# Patient Record
Sex: MALE | Race: WHITE | Employment: FULL TIME | URBAN - METROPOLITAN AREA
[De-identification: names, ages, dates, MRNs, and addresses within clinical notes are randomized per-mention and may not be internally consistent; named-entity substitution may affect disease eponyms.]

---

## 2017-03-27 RX ORDER — LOSARTAN POTASSIUM 100 MG/1
TABLET ORAL
Qty: 90 TABLET | Refills: 1 | Status: SHIPPED | OUTPATIENT
Start: 2017-03-27 | End: 2017-09-23 | Stop reason: SDUPTHER

## 2017-05-26 ENCOUNTER — TELEPHONE (OUTPATIENT)
Dept: FAMILY MEDICINE CLINIC | Age: 47
End: 2017-05-26

## 2017-05-26 DIAGNOSIS — R74.8 ABNORMAL TRANSAMINASES: ICD-10-CM

## 2017-05-26 DIAGNOSIS — Z11.4 SCREENING FOR HIV (HUMAN IMMUNODEFICIENCY VIRUS): ICD-10-CM

## 2017-05-26 DIAGNOSIS — Z00.00 ROUTINE GENERAL MEDICAL EXAMINATION AT A HEALTH CARE FACILITY: Primary | ICD-10-CM

## 2017-05-26 DIAGNOSIS — Z13.220 LIPID SCREENING: ICD-10-CM

## 2017-05-26 DIAGNOSIS — R73.01 IMPAIRED FASTING GLUCOSE: ICD-10-CM

## 2017-09-25 RX ORDER — LOSARTAN POTASSIUM 100 MG/1
TABLET ORAL
Qty: 90 TABLET | Refills: 0 | Status: SHIPPED | OUTPATIENT
Start: 2017-09-25 | End: 2017-10-24 | Stop reason: SDUPTHER

## 2017-10-07 ENCOUNTER — HOSPITAL ENCOUNTER (OUTPATIENT)
Age: 47
Discharge: HOME | End: 2017-10-07
Payer: COMMERCIAL

## 2017-10-07 DIAGNOSIS — R73.01 IMPAIRED FASTING GLUCOSE: ICD-10-CM

## 2017-10-07 DIAGNOSIS — Z13.220 LIPID SCREENING: ICD-10-CM

## 2017-10-07 DIAGNOSIS — Z11.4 SCREENING FOR HIV (HUMAN IMMUNODEFICIENCY VIRUS): ICD-10-CM

## 2017-10-07 DIAGNOSIS — Z00.00 ROUTINE GENERAL MEDICAL EXAMINATION AT A HEALTH CARE FACILITY: ICD-10-CM

## 2017-10-07 DIAGNOSIS — R74.8 ABNORMAL TRANSAMINASES: ICD-10-CM

## 2017-10-07 LAB
ALBUMIN SERPL-MCNC: 4.4 G/DL (ref 3.5–5.2)
ALBUMIN/GLOBULIN RATIO: 1.5 (ref 1–2.5)
ALP BLD-CCNC: 52 U/L (ref 40–129)
ALT SERPL-CCNC: 33 U/L (ref 5–41)
ANION GAP SERPL CALCULATED.3IONS-SCNC: 14 MMOL/L (ref 9–17)
AST SERPL-CCNC: 23 U/L
BILIRUB SERPL-MCNC: 0.58 MG/DL (ref 0.3–1.2)
BUN BLDV-MCNC: 17 MG/DL (ref 6–20)
BUN/CREAT BLD: NORMAL (ref 9–20)
CALCIUM SERPL-MCNC: 9.3 MG/DL (ref 8.6–10.4)
CHLORIDE BLD-SCNC: 100 MMOL/L (ref 98–107)
CHOLESTEROL/HDL RATIO: 3.7
CHOLESTEROL: 208 MG/DL
CO2: 25 MMOL/L (ref 20–31)
CREAT SERPL-MCNC: 0.97 MG/DL (ref 0.7–1.2)
GFR AFRICAN AMERICAN: >60 ML/MIN
GFR NON-AFRICAN AMERICAN: >60 ML/MIN
GFR SERPL CREATININE-BSD FRML MDRD: NORMAL ML/MIN/{1.73_M2}
GFR SERPL CREATININE-BSD FRML MDRD: NORMAL ML/MIN/{1.73_M2}
GLUCOSE BLD-MCNC: 93 MG/DL (ref 70–99)
HDLC SERPL-MCNC: 56 MG/DL
HIV AG/AB: NONREACTIVE
LDL CHOLESTEROL: 126 MG/DL (ref 0–130)
POTASSIUM SERPL-SCNC: 4.9 MMOL/L (ref 3.7–5.3)
SODIUM BLD-SCNC: 139 MMOL/L (ref 135–144)
TOTAL PROTEIN: 7.4 G/DL (ref 6.4–8.3)
TRIGL SERPL-MCNC: 129 MG/DL
VLDLC SERPL CALC-MCNC: ABNORMAL MG/DL (ref 1–30)

## 2017-10-07 PROCEDURE — 87389 HIV-1 AG W/HIV-1&-2 AB AG IA: CPT

## 2017-10-07 PROCEDURE — 80061 LIPID PANEL: CPT

## 2017-10-07 PROCEDURE — 36415 COLL VENOUS BLD VENIPUNCTURE: CPT

## 2017-10-07 PROCEDURE — 80053 COMPREHEN METABOLIC PANEL: CPT

## 2017-10-09 PROBLEM — E78.00 HIGH CHOLESTEROL: Status: ACTIVE | Noted: 2017-10-09

## 2017-10-13 ENCOUNTER — OFFICE VISIT (OUTPATIENT)
Dept: FAMILY MEDICINE CLINIC | Age: 47
End: 2017-10-13
Payer: COMMERCIAL

## 2017-10-13 VITALS
DIASTOLIC BLOOD PRESSURE: 88 MMHG | SYSTOLIC BLOOD PRESSURE: 137 MMHG | BODY MASS INDEX: 30.21 KG/M2 | HEART RATE: 66 BPM | WEIGHT: 211 LBS | HEIGHT: 70 IN

## 2017-10-13 DIAGNOSIS — E78.00 HIGH CHOLESTEROL: ICD-10-CM

## 2017-10-13 DIAGNOSIS — I10 ESSENTIAL HYPERTENSION: ICD-10-CM

## 2017-10-13 DIAGNOSIS — Z00.00 ROUTINE GENERAL MEDICAL EXAMINATION AT A HEALTH CARE FACILITY: Primary | ICD-10-CM

## 2017-10-13 PROCEDURE — 99396 PREV VISIT EST AGE 40-64: CPT | Performed by: FAMILY MEDICINE

## 2017-10-13 ASSESSMENT — PATIENT HEALTH QUESTIONNAIRE - PHQ9
1. LITTLE INTEREST OR PLEASURE IN DOING THINGS: 0
SUM OF ALL RESPONSES TO PHQ9 QUESTIONS 1 & 2: 0
SUM OF ALL RESPONSES TO PHQ QUESTIONS 1-9: 0
2. FEELING DOWN, DEPRESSED OR HOPELESS: 0

## 2017-10-13 NOTE — PATIENT INSTRUCTIONS
A Healthy Lifestyle: Care Instructions  Your Care Instructions  A healthy lifestyle can help you feel good, stay at a healthy weight, and have plenty of energy for both work and play. A healthy lifestyle is something you can share with your whole family. A healthy lifestyle also can lower your risk for serious health problems, such as high blood pressure, heart disease, and diabetes. You can follow a few steps listed below to improve your health and the health of your family. Follow-up care is a key part of your treatment and safety. Be sure to make and go to all appointments, and call your doctor if you are having problems. Its also a good idea to know your test results and keep a list of the medicines you take. How can you care for yourself at home? · Do not eat too much sugar, fat, or fast foods. You can still have dessert and treats now and then. The goal is moderation. · Start small to improve your eating habits. Pay attention to portion sizes, drink less juice and soda pop, and eat more fruits and vegetables. ¨ Eat a healthy amount of food. A 3-ounce serving of meat, for example, is about the size of a deck of cards. Fill the rest of your plate with vegetables and whole grains. ¨ Limit the amount of soda and sports drinks you have every day. Drink more water when you are thirsty. ¨ Eat at least 5 servings of fruits and vegetables every day. It may seem like a lot, but it is not hard to reach this goal. A serving or helping is 1 piece of fruit, 1 cup of vegetables, or 2 cups of leafy, raw vegetables. Have an apple or some carrot sticks as an afternoon snack instead of a candy bar. Try to have fruits and/or vegetables at every meal.  · Make exercise part of your daily routine. You may want to start with simple activities, such as walking, bicycling, or slow swimming. Try to be active 30 to 60 minutes every day. You do not need to do all 30 to 60 minutes all at once.  For example, you can exercise 3

## 2017-10-13 NOTE — PROGRESS NOTES
5' 10.08\" (1.78 m)   Wt 211 lb (95.7 kg)   BMI 30.21 kg/m²   Constitutional:  He is well-groomed, well-dressed, and in no acute distress. HENT:  Normocephalic, Atraumatic, Bilateral external ears normal, Oropharynx moist, No oral exudates, Nose normal. Neck- Normal range of motion, No tenderness, Supple, No stridor. Eyes:  PERRL, EOMI, Conjunctiva normal, No discharge. Respiratory:  Normal breath sounds, No respiratory distress, No wheezing, No chest tenderness. Cardiovascular:  Normal heart rate, Normal rhythm, No murmurs, No rubs, No gallops. GI:  Soft, No tenderness  Musculoskeletal:  Intact distal pulses, No edema, No tenderness, No cyanosis, No clubbing. Good range of motion in all major joints. No tenderness to palpation or major deformities noted. Back- No tenderness. Integument:  No obvious lesions on exposed skin. Lymphatic:  No lymphadenopathy noted. Neurologic:  Alert & oriented x 3, Normal motor function, Normal sensory function, No focal deficits noted. Psychiatric:  Affect normal, Judgment normal, Mood normal.     RESULTS  The 10-year ASCVD risk score (Viry Vigil., et al., 2013) is: 7.7%    Values used to calculate the score:      Age: 51 years      Sex: Male      Is Non- : No      Diabetic: No      Tobacco smoker: Yes      Systolic Blood Pressure: 340 mmHg      Is BP treated: Yes      HDL Cholesterol: 56 mg/dL      Total Cholesterol: 208 mg/dL       Chemistry        Component Value Date/Time     10/07/2017 0750    K 4.9 10/07/2017 0750     10/07/2017 0750    CO2 25 10/07/2017 0750    BUN 17 10/07/2017 0750    CREATININE 0.97 10/07/2017 0750        Component Value Date/Time    CALCIUM 9.3 10/07/2017 0750    ALKPHOS 52 10/07/2017 0750    AST 23 10/07/2017 0750    ALT 33 10/07/2017 0750    BILITOT 0.58 10/07/2017 0750        Glucose   Date Value Ref Range Status   10/07/2017 93 70 - 99 mg/dL Final         FINAL DIAGNOSIS AND ORDERS  1.  Routine general medical examination at a health care facility     2. Essential hypertension     3. High cholesterol         ASSESSMENT & PLAN  3  56-year-old man, with the above noted issues, who appears generally healthy. 2.  Continue current treatment for high blood pressure. 3.  I have recommended a 81 mg aspirin tablet daily. 4.  Follow up in our office in approximately 1 year or as needed. DISCHARGE MEDS  Outpatient Encounter Prescriptions as of 10/13/2017   Medication Sig Dispense Refill    losartan (COZAAR) 100 MG tablet TAKE 1 TABLET DAILY 90 tablet 0    hydrocortisone (PROCTOSOL HC) 2.5 % rectal cream Place rectally 2 times daily. 28.35 g 2    triamcinolone (KENALOG) 0.1 % cream       clobetasol (TEMOVATE) 0.05 % external solution        No facility-administered encounter medications on file as of 10/13/2017.

## 2017-10-24 RX ORDER — CLOBETASOL PROPIONATE 0.46 MG/ML
SOLUTION TOPICAL
Qty: 1 BOTTLE | Refills: 2 | Status: SHIPPED | OUTPATIENT
Start: 2017-10-24 | End: 2018-10-26 | Stop reason: SDUPTHER

## 2017-10-24 RX ORDER — LOSARTAN POTASSIUM 100 MG/1
TABLET ORAL
Qty: 90 TABLET | Refills: 1 | Status: SHIPPED | OUTPATIENT
Start: 2017-10-24 | End: 2018-04-23 | Stop reason: SDUPTHER

## 2018-04-23 DIAGNOSIS — K62.89 PROCTITIS: ICD-10-CM

## 2018-04-23 RX ORDER — LOSARTAN POTASSIUM 100 MG/1
TABLET ORAL
Qty: 90 TABLET | Refills: 1 | Status: SHIPPED | OUTPATIENT
Start: 2018-04-23 | End: 2018-10-26 | Stop reason: SDUPTHER

## 2018-04-23 RX ORDER — TRIAMCINOLONE ACETONIDE 1 MG/G
CREAM TOPICAL
Qty: 15 G | Refills: 1 | Status: SHIPPED | OUTPATIENT
Start: 2018-04-23

## 2018-05-09 ENCOUNTER — OFFICE VISIT (OUTPATIENT)
Dept: PRIMARY CARE CLINIC | Age: 48
End: 2018-05-09
Payer: COMMERCIAL

## 2018-05-09 ENCOUNTER — HOSPITAL ENCOUNTER (OUTPATIENT)
Age: 48
Setting detail: SPECIMEN
Discharge: HOME | End: 2018-05-09
Payer: COMMERCIAL

## 2018-05-09 VITALS
RESPIRATION RATE: 16 BRPM | SYSTOLIC BLOOD PRESSURE: 130 MMHG | TEMPERATURE: 98.4 F | WEIGHT: 200 LBS | DIASTOLIC BLOOD PRESSURE: 78 MMHG | BODY MASS INDEX: 28.63 KG/M2 | HEART RATE: 84 BPM

## 2018-05-09 DIAGNOSIS — R74.8 ABNORMAL TRANSAMINASES: ICD-10-CM

## 2018-05-09 DIAGNOSIS — R10.9 ABDOMINAL CRAMPING: ICD-10-CM

## 2018-05-09 DIAGNOSIS — R73.01 IMPAIRED FASTING GLUCOSE: ICD-10-CM

## 2018-05-09 DIAGNOSIS — R19.4 BOWEL HABIT CHANGES: ICD-10-CM

## 2018-05-09 DIAGNOSIS — R19.7 DIARRHEA, UNSPECIFIED TYPE: Primary | ICD-10-CM

## 2018-05-09 LAB
BILIRUBIN URINE: NEGATIVE
BILIRUBIN, POC: NORMAL
BLOOD URINE, POC: NORMAL
CLARITY, POC: CLEAR
COLOR, POC: YELLOW
COLOR: YELLOW
COMMENT UA: NORMAL
GLUCOSE URINE, POC: NORMAL
GLUCOSE URINE: NEGATIVE
HBA1C MFR BLD: 5.2 %
KETONES, POC: NORMAL
KETONES, URINE: NEGATIVE
LEUKOCYTE EST, POC: NORMAL
LEUKOCYTE ESTERASE, URINE: NEGATIVE
NITRITE, POC: NORMAL
NITRITE, URINE: NEGATIVE
PH UA: 5 (ref 5–8)
PH, POC: 5.5
PROTEIN UA: NEGATIVE
PROTEIN, POC: NORMAL
SPECIFIC GRAVITY UA: 1.03 (ref 1–1.03)
SPECIFIC GRAVITY, POC: 1.02
TURBIDITY: CLEAR
URINE HGB: NEGATIVE
UROBILINOGEN, POC: 1
UROBILINOGEN, URINE: NORMAL

## 2018-05-09 PROCEDURE — 81002 URINALYSIS NONAUTO W/O SCOPE: CPT | Performed by: NURSE PRACTITIONER

## 2018-05-09 PROCEDURE — 83036 HEMOGLOBIN GLYCOSYLATED A1C: CPT | Performed by: NURSE PRACTITIONER

## 2018-05-09 PROCEDURE — 99214 OFFICE O/P EST MOD 30 MIN: CPT | Performed by: NURSE PRACTITIONER

## 2018-05-09 ASSESSMENT — ENCOUNTER SYMPTOMS
VOMITING: 0
DIARRHEA: 1
ABDOMINAL DISTENTION: 0
BLOATING: 0
FLATUS: 1
SHORTNESS OF BREATH: 0
ABDOMINAL PAIN: 0
CONSTIPATION: 0
RECTAL PAIN: 0
ANAL BLEEDING: 0
RHINORRHEA: 0
BLOOD IN STOOL: 0
COUGH: 0
NAUSEA: 1

## 2018-05-12 ENCOUNTER — HOSPITAL ENCOUNTER (OUTPATIENT)
Age: 48
Discharge: HOME | End: 2018-05-12
Payer: COMMERCIAL

## 2018-05-12 DIAGNOSIS — R74.8 ABNORMAL TRANSAMINASES: ICD-10-CM

## 2018-05-12 DIAGNOSIS — R10.9 ABDOMINAL CRAMPING: ICD-10-CM

## 2018-05-12 DIAGNOSIS — R19.4 BOWEL HABIT CHANGES: ICD-10-CM

## 2018-05-12 DIAGNOSIS — R19.7 DIARRHEA, UNSPECIFIED TYPE: ICD-10-CM

## 2018-05-12 LAB
ALBUMIN SERPL-MCNC: 4.2 G/DL (ref 3.5–5.2)
ALBUMIN/GLOBULIN RATIO: 1.5 (ref 1–2.5)
ALP BLD-CCNC: 67 U/L (ref 40–129)
ALT SERPL-CCNC: 32 U/L (ref 5–41)
ANION GAP SERPL CALCULATED.3IONS-SCNC: 10 MMOL/L (ref 9–17)
AST SERPL-CCNC: 19 U/L
BILIRUB SERPL-MCNC: 0.35 MG/DL (ref 0.3–1.2)
BUN BLDV-MCNC: 15 MG/DL (ref 6–20)
BUN/CREAT BLD: NORMAL (ref 9–20)
CALCIUM SERPL-MCNC: 9 MG/DL (ref 8.6–10.4)
CHLORIDE BLD-SCNC: 104 MMOL/L (ref 98–107)
CO2: 27 MMOL/L (ref 20–31)
CREAT SERPL-MCNC: 0.98 MG/DL (ref 0.7–1.2)
GFR AFRICAN AMERICAN: >60 ML/MIN
GFR NON-AFRICAN AMERICAN: >60 ML/MIN
GFR SERPL CREATININE-BSD FRML MDRD: NORMAL ML/MIN/{1.73_M2}
GFR SERPL CREATININE-BSD FRML MDRD: NORMAL ML/MIN/{1.73_M2}
GLUCOSE BLD-MCNC: 90 MG/DL (ref 70–99)
HCT VFR BLD CALC: 44.8 % (ref 40.7–50.3)
HEMOGLOBIN: 14.9 G/DL (ref 13–17)
MCH RBC QN AUTO: 29.5 PG (ref 25.2–33.5)
MCHC RBC AUTO-ENTMCNC: 33.3 G/DL (ref 28.4–34.8)
MCV RBC AUTO: 88.7 FL (ref 82.6–102.9)
NRBC AUTOMATED: 0 PER 100 WBC
PDW BLD-RTO: 12.1 % (ref 11.8–14.4)
PLATELET # BLD: 296 K/UL (ref 138–453)
PMV BLD AUTO: 10.1 FL (ref 8.1–13.5)
POTASSIUM SERPL-SCNC: 4.4 MMOL/L (ref 3.7–5.3)
RBC # BLD: 5.05 M/UL (ref 4.21–5.77)
SODIUM BLD-SCNC: 141 MMOL/L (ref 135–144)
TOTAL PROTEIN: 7 G/DL (ref 6.4–8.3)
TSH SERPL DL<=0.05 MIU/L-ACNC: 4.1 MIU/L (ref 0.3–5)
WBC # BLD: 6.3 K/UL (ref 3.5–11.3)

## 2018-05-12 PROCEDURE — 84443 ASSAY THYROID STIM HORMONE: CPT

## 2018-05-12 PROCEDURE — 85027 COMPLETE CBC AUTOMATED: CPT

## 2018-05-12 PROCEDURE — 36415 COLL VENOUS BLD VENIPUNCTURE: CPT

## 2018-05-12 PROCEDURE — 80053 COMPREHEN METABOLIC PANEL: CPT

## 2018-05-16 ENCOUNTER — OFFICE VISIT (OUTPATIENT)
Dept: PRIMARY CARE CLINIC | Age: 48
End: 2018-05-16
Payer: COMMERCIAL

## 2018-05-16 VITALS
WEIGHT: 200 LBS | RESPIRATION RATE: 16 BRPM | HEART RATE: 78 BPM | DIASTOLIC BLOOD PRESSURE: 70 MMHG | SYSTOLIC BLOOD PRESSURE: 118 MMHG | BODY MASS INDEX: 28.63 KG/M2

## 2018-05-16 DIAGNOSIS — E03.8 SUBCLINICAL HYPOTHYROIDISM: Primary | ICD-10-CM

## 2018-05-16 DIAGNOSIS — R19.7 DIARRHEA, UNSPECIFIED TYPE: ICD-10-CM

## 2018-05-16 DIAGNOSIS — R74.8 ABNORMAL TRANSAMINASES: ICD-10-CM

## 2018-05-16 PROCEDURE — 99214 OFFICE O/P EST MOD 30 MIN: CPT | Performed by: NURSE PRACTITIONER

## 2018-05-16 RX ORDER — LEVOTHYROXINE SODIUM 0.05 MG/1
50 TABLET ORAL DAILY
Qty: 30 TABLET | Refills: 3 | Status: SHIPPED | OUTPATIENT
Start: 2018-05-16 | End: 2019-10-28

## 2018-06-29 ENCOUNTER — HOSPITAL ENCOUNTER (OUTPATIENT)
Age: 48
Discharge: HOME | End: 2018-06-29
Payer: COMMERCIAL

## 2018-06-29 DIAGNOSIS — E03.8 SUBCLINICAL HYPOTHYROIDISM: ICD-10-CM

## 2018-06-29 LAB — TSH SERPL DL<=0.05 MIU/L-ACNC: 4.16 MIU/L (ref 0.3–5)

## 2018-06-29 PROCEDURE — 84443 ASSAY THYROID STIM HORMONE: CPT

## 2018-06-29 PROCEDURE — 36415 COLL VENOUS BLD VENIPUNCTURE: CPT

## 2018-09-25 ENCOUNTER — TELEPHONE (OUTPATIENT)
Dept: PRIMARY CARE CLINIC | Age: 48
End: 2018-09-25

## 2018-09-25 DIAGNOSIS — R74.8 ABNORMAL TRANSAMINASES: ICD-10-CM

## 2018-09-25 DIAGNOSIS — E78.00 HIGH CHOLESTEROL: ICD-10-CM

## 2018-09-25 DIAGNOSIS — E03.8 SUBCLINICAL HYPOTHYROIDISM: ICD-10-CM

## 2018-09-25 DIAGNOSIS — R73.01 IMPAIRED FASTING GLUCOSE: Primary | ICD-10-CM

## 2018-09-25 NOTE — TELEPHONE ENCOUNTER
Patient called and requested a lab slip for his annual bloodwork , be mailed to him prior to his next appointment on 10/26/2018 .  Thank You

## 2018-10-21 ENCOUNTER — HOSPITAL ENCOUNTER (OUTPATIENT)
Age: 48
Discharge: HOME | End: 2018-10-21
Payer: COMMERCIAL

## 2018-10-21 DIAGNOSIS — E78.00 HIGH CHOLESTEROL: ICD-10-CM

## 2018-10-21 DIAGNOSIS — R73.01 IMPAIRED FASTING GLUCOSE: ICD-10-CM

## 2018-10-21 DIAGNOSIS — E03.8 SUBCLINICAL HYPOTHYROIDISM: ICD-10-CM

## 2018-10-21 DIAGNOSIS — R74.8 ABNORMAL TRANSAMINASES: ICD-10-CM

## 2018-10-21 LAB
ALBUMIN SERPL-MCNC: 4.6 G/DL (ref 3.5–5.2)
ALBUMIN/GLOBULIN RATIO: 1.6 (ref 1–2.5)
ALP BLD-CCNC: 55 U/L (ref 40–129)
ALT SERPL-CCNC: 29 U/L (ref 5–41)
ANION GAP SERPL CALCULATED.3IONS-SCNC: 18 MMOL/L (ref 9–17)
AST SERPL-CCNC: 25 U/L
BILIRUB SERPL-MCNC: 0.62 MG/DL (ref 0.3–1.2)
BUN BLDV-MCNC: 14 MG/DL (ref 6–20)
BUN/CREAT BLD: ABNORMAL (ref 9–20)
CALCIUM SERPL-MCNC: 9.5 MG/DL (ref 8.6–10.4)
CHLORIDE BLD-SCNC: 102 MMOL/L (ref 98–107)
CHOLESTEROL/HDL RATIO: 3.2
CHOLESTEROL: 204 MG/DL
CO2: 24 MMOL/L (ref 20–31)
CREAT SERPL-MCNC: 1.1 MG/DL (ref 0.7–1.2)
GFR AFRICAN AMERICAN: >60 ML/MIN
GFR NON-AFRICAN AMERICAN: >60 ML/MIN
GFR SERPL CREATININE-BSD FRML MDRD: ABNORMAL ML/MIN/{1.73_M2}
GFR SERPL CREATININE-BSD FRML MDRD: ABNORMAL ML/MIN/{1.73_M2}
GLUCOSE BLD-MCNC: 93 MG/DL (ref 70–99)
HDLC SERPL-MCNC: 64 MG/DL
LDL CHOLESTEROL: 126 MG/DL (ref 0–130)
POTASSIUM SERPL-SCNC: 5.1 MMOL/L (ref 3.7–5.3)
SODIUM BLD-SCNC: 144 MMOL/L (ref 135–144)
TOTAL PROTEIN: 7.4 G/DL (ref 6.4–8.3)
TRIGL SERPL-MCNC: 70 MG/DL
TSH SERPL DL<=0.05 MIU/L-ACNC: 3.82 MIU/L (ref 0.3–5)
VLDLC SERPL CALC-MCNC: ABNORMAL MG/DL (ref 1–30)

## 2018-10-21 PROCEDURE — 84443 ASSAY THYROID STIM HORMONE: CPT

## 2018-10-21 PROCEDURE — 80053 COMPREHEN METABOLIC PANEL: CPT

## 2018-10-21 PROCEDURE — 83036 HEMOGLOBIN GLYCOSYLATED A1C: CPT

## 2018-10-21 PROCEDURE — 36415 COLL VENOUS BLD VENIPUNCTURE: CPT

## 2018-10-21 PROCEDURE — 80061 LIPID PANEL: CPT

## 2018-10-22 LAB
ESTIMATED AVERAGE GLUCOSE: 105 MG/DL
HBA1C MFR BLD: 5.3 % (ref 4–6)

## 2018-10-26 ENCOUNTER — OFFICE VISIT (OUTPATIENT)
Dept: PRIMARY CARE CLINIC | Age: 48
End: 2018-10-26
Payer: COMMERCIAL

## 2018-10-26 VITALS
RESPIRATION RATE: 16 BRPM | HEART RATE: 90 BPM | OXYGEN SATURATION: 97 % | DIASTOLIC BLOOD PRESSURE: 80 MMHG | SYSTOLIC BLOOD PRESSURE: 122 MMHG | WEIGHT: 207.2 LBS | BODY MASS INDEX: 31.4 KG/M2 | HEIGHT: 68 IN

## 2018-10-26 DIAGNOSIS — Z23 NEED FOR INFLUENZA VACCINATION: ICD-10-CM

## 2018-10-26 DIAGNOSIS — Z00.00 ROUTINE GENERAL MEDICAL EXAMINATION AT A HEALTH CARE FACILITY: Primary | ICD-10-CM

## 2018-10-26 PROCEDURE — 90471 IMMUNIZATION ADMIN: CPT | Performed by: FAMILY MEDICINE

## 2018-10-26 PROCEDURE — 99396 PREV VISIT EST AGE 40-64: CPT | Performed by: FAMILY MEDICINE

## 2018-10-26 PROCEDURE — 90688 IIV4 VACCINE SPLT 0.5 ML IM: CPT | Performed by: FAMILY MEDICINE

## 2018-10-26 RX ORDER — CLOBETASOL PROPIONATE 0.46 MG/ML
SOLUTION TOPICAL
Qty: 1 BOTTLE | Refills: 2 | Status: SHIPPED | OUTPATIENT
Start: 2018-10-26 | End: 2018-11-01 | Stop reason: SDUPTHER

## 2018-10-26 RX ORDER — LOSARTAN POTASSIUM 100 MG/1
TABLET ORAL
Qty: 90 TABLET | Refills: 3 | Status: SHIPPED | OUTPATIENT
Start: 2018-10-26 | End: 2018-11-01 | Stop reason: SDUPTHER

## 2018-10-26 ASSESSMENT — PATIENT HEALTH QUESTIONNAIRE - PHQ9
SUM OF ALL RESPONSES TO PHQ QUESTIONS 1-9: 0
SUM OF ALL RESPONSES TO PHQ9 QUESTIONS 1 & 2: 0
2. FEELING DOWN, DEPRESSED OR HOPELESS: 0
1. LITTLE INTEREST OR PLEASURE IN DOING THINGS: 0
SUM OF ALL RESPONSES TO PHQ QUESTIONS 1-9: 0

## 2018-11-01 RX ORDER — LOSARTAN POTASSIUM 100 MG/1
TABLET ORAL
Qty: 90 TABLET | Refills: 3 | Status: SHIPPED | OUTPATIENT
Start: 2018-11-01 | End: 2018-11-01 | Stop reason: SDUPTHER

## 2018-11-01 RX ORDER — CLOBETASOL PROPIONATE 0.46 MG/ML
SOLUTION TOPICAL
Qty: 1 BOTTLE | Refills: 2 | Status: SHIPPED | OUTPATIENT
Start: 2018-11-01

## 2018-11-01 RX ORDER — LOSARTAN POTASSIUM 100 MG/1
TABLET ORAL
Qty: 30 TABLET | Refills: 0 | Status: SHIPPED | OUTPATIENT
Start: 2018-11-01

## 2019-10-28 ENCOUNTER — OFFICE VISIT (OUTPATIENT)
Dept: PRIMARY CARE CLINIC | Age: 49
End: 2019-10-28
Payer: COMMERCIAL

## 2019-10-28 VITALS
HEART RATE: 80 BPM | WEIGHT: 207.23 LBS | DIASTOLIC BLOOD PRESSURE: 74 MMHG | BODY MASS INDEX: 31.41 KG/M2 | OXYGEN SATURATION: 96 % | HEIGHT: 68 IN | SYSTOLIC BLOOD PRESSURE: 116 MMHG | RESPIRATION RATE: 18 BRPM

## 2019-10-28 DIAGNOSIS — K13.0 DRY LIPS: ICD-10-CM

## 2019-10-28 DIAGNOSIS — Z00.00 HEALTH CARE MAINTENANCE: ICD-10-CM

## 2019-10-28 DIAGNOSIS — E78.5 HYPERLIPIDEMIA, UNSPECIFIED HYPERLIPIDEMIA TYPE: ICD-10-CM

## 2019-10-28 DIAGNOSIS — Z12.11 COLON CANCER SCREENING: ICD-10-CM

## 2019-10-28 PROCEDURE — 99396 PREV VISIT EST AGE 40-64: CPT | Performed by: FAMILY MEDICINE

## 2019-10-28 ASSESSMENT — ENCOUNTER SYMPTOMS
VOMITING: 0
DIARRHEA: 0
CHEST TIGHTNESS: 0
CONSTIPATION: 0
NAUSEA: 0
SORE THROAT: 0
SHORTNESS OF BREATH: 0
ROS SKIN COMMENTS: DRY LIPS
ABDOMINAL PAIN: 0

## 2020-10-05 ENCOUNTER — TELEPHONE (OUTPATIENT)
Dept: PRIMARY CARE CLINIC | Age: 50
End: 2020-10-05

## 2021-03-17 ENCOUNTER — IMMUNIZATION (OUTPATIENT)
Dept: PRIMARY CARE CLINIC | Age: 51
End: 2021-03-17
Payer: COMMERCIAL

## 2021-03-17 PROCEDURE — 0001A COVID-19, PFIZER VACCINE 30MCG/0.3ML DOSE: CPT | Performed by: INTERNAL MEDICINE

## 2021-03-17 PROCEDURE — 91300 COVID-19, PFIZER VACCINE 30MCG/0.3ML DOSE: CPT | Performed by: INTERNAL MEDICINE

## 2021-04-07 ENCOUNTER — IMMUNIZATION (OUTPATIENT)
Dept: PRIMARY CARE CLINIC | Age: 51
End: 2021-04-07
Payer: COMMERCIAL

## 2021-04-07 PROCEDURE — 91300 COVID-19, PFIZER VACCINE 30MCG/0.3ML DOSE: CPT | Performed by: INTERNAL MEDICINE

## 2021-04-07 PROCEDURE — 0002A COVID-19, PFIZER VACCINE 30MCG/0.3ML DOSE: CPT | Performed by: INTERNAL MEDICINE

## 2022-06-29 ENCOUNTER — HOSPITAL ENCOUNTER (OUTPATIENT)
Age: 52
Discharge: HOME | End: 2022-07-01
Payer: COMMERCIAL

## 2022-06-29 ENCOUNTER — HOSPITAL ENCOUNTER (OUTPATIENT)
Dept: GENERAL RADIOLOGY | Age: 52
Discharge: HOME | End: 2022-07-01
Payer: COMMERCIAL

## 2022-06-29 DIAGNOSIS — G89.29 CHRONIC PAIN OF TOE OF RIGHT FOOT: ICD-10-CM

## 2022-06-29 DIAGNOSIS — M79.674 CHRONIC PAIN OF TOE OF RIGHT FOOT: ICD-10-CM

## 2022-06-29 PROCEDURE — 73630 X-RAY EXAM OF FOOT: CPT

## 2022-07-27 ENCOUNTER — OFFICE VISIT (OUTPATIENT)
Dept: PODIATRY | Age: 52
End: 2022-07-27
Payer: COMMERCIAL

## 2022-07-27 VITALS — HEIGHT: 69 IN | BODY MASS INDEX: 29.62 KG/M2 | WEIGHT: 200 LBS

## 2022-07-27 DIAGNOSIS — M19.171: Primary | ICD-10-CM

## 2022-07-27 DIAGNOSIS — M79.671 RIGHT FOOT PAIN: ICD-10-CM

## 2022-07-27 PROCEDURE — 99203 OFFICE O/P NEW LOW 30 MIN: CPT | Performed by: PODIATRIST

## 2022-07-27 NOTE — PROGRESS NOTES
[address].  Dept: [phone]    NEW PATIENT PROGRESS NOTE  Date of patient's visit: 7/27/2022  Patient's Name:  Rose Marie Hughes YOB: 1970            Patient Care Team:  Hola Magaña as PCP - General (Family Medicine)        Chief Complaint   Patient presents with    New Patient    Toe Pain     Right great toe pain x 1 year         HPI:   Rose Marie Hughes is a 45 y.o. male who presents to the office today complaining of right great toe pain. Symptoms began 1 year(s) ago. Patient relates pain is Present. Pain is rated 3 out of 10 and is described as intermittent. Treatments prior to today's visit include: none. Currently denies F/C/N/V. Pt's primary care physician is Rafat Rojas last seen June 30 2022. Patient states he jammed his right great toe over a year ago and he is still having pain. States his toe has pain when walking     No Known Allergies    Past Medical History:   Diagnosis Date    High cholesterol 10/9/2017    Hypertension     Subclinical hypothyroidism 5/16/2018       Prior to Admission medications    Medication Sig Start Date End Date Taking? Authorizing Provider   clobetasol (TEMOVATE) 0.05 % external solution Apply to scalp 2 times daily 11/1/18  Yes Janiya Jennings MD   losartan (COZAAR) 100 MG tablet TAKE 1 TABLET DAILY 11/1/18  Yes Janiya Jennings MD   hydrocortisone (PROCTOSOL HC) 2.5 % rectal cream Place rectally 2 times daily.  4/23/18  Yes Janiya Jennings MD   triamcinolone (KENALOG) 0.1 % cream Use as directed 4/23/18  Yes Janiya Jennings MD       Past Surgical History:   Procedure Laterality Date    HERNIA REPAIR      TONSILLECTOMY         Family History   Problem Relation Age of Onset    High Blood Pressure Father     Stroke Father        Social History     Tobacco Use    Smoking status: Never    Smokeless tobacco: Current Types: Chew   Substance Use Topics    Alcohol use: No       Review of Systems    Review of Systems:   History obtained from chart review and the patient  General ROS: negative for - chills, fatigue, fever, night sweats or weight gain  Constitutional: Negative for chills, diaphoresis, fatigue, fever and unexpected weight change. Musculoskeletal: Positive for arthralgias, gait problem and joint swelling. Neurological ROS: negative for - behavioral changes, confusion, headaches or seizures. Negative for weakness and numbness. Dermatological ROS: negative for - mole changes, rash  Cardiovascular: Negative for leg swelling. Gastrointestinal: Negative for constipation, diarrhea, nausea and vomiting. Lower Extremity Physical Examination:   Vitals: There were no vitals filed for this visit. General: AAO x 3 in NAD. Dermatologic Exam:  Skin lesion/ulceration Absent . Skin No rashes or nodules noted. .       Musculoskeletal:     1st MPJ ROM decreased, Bilateral.  Muscle strength 5/5, Bilateral.  Pain present upon palpation of right great toe to the dorsal aspect  of the 1st MTPJ . Medial longitudinal arch, Bilateral WNL.   Ankle ROM WNL,Bilateral.    Dorsally contracted digits absent digits 1-5 Bilateral.     Vascular: DP and PT pulses palpable 2/4, Bilateral.  CFT <3 seconds, Bilateral.  Hair growth present to the level of the digits, Bilateral.  Edema absent, Bilateral.  Varicosities absent, Bilateral. Erythema absent, Bilateral    Neurological: Sensation intact to light touch to level of digits, Bilateral.  Protective sensation intact 10/10 sites via 5.07/10g Oark-Elen Monofilament, Bilateral.  negative Tinel's, Bilateral.  negative Valleix sign, Bilateral.      Integument: Warm, dry, supple, Bilateral.  Open lesion absent, Bilateral.  Interdigital maceration absent to web spaces 1-4, Bilateral.  Nails are normal in length, thickness and color 1-5 bilateral.  Fissures absent, Bilateral. X ray right foot 3 views:  Impression   Degenerative changes       No acute bony or joint space findings           Asessment: Patient is a 45 y.o. male with:    Diagnosis Orders   1. 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 Right foot pain              Plan: Patient examined and evaluated. Current condition and treatment options discussed in detail. Discussed conservative and surgical options with the patient. Advised pt to his condition and reviewed the xray findings of the patient. .       I had a long discussion today with the patient about the likely diagnosis and its natural history, physical exam and imaging findings, as well as treatment options. We discussed both surgical and nonsurgical treatments, including risks and benefits. From a nonoperative standpoint, we discussed rest/activity modification, NSAIDs/Acetaminophen/topical anesthetics, orthotics, bracing/immobilization, and physical therapy. Surgically, we disucssed mod byrne bunion vs 1st MTPJ implant. Pt would like to try conservative care at this time. Verbal and written instructions given to patient. Contact office with any questions/problems/concerns.   RTC in 9week(s).    7/27/2022    Electronically signed by Satya Pinto DPM on 7/27/2022 at 1:53 PM  7/27/2022

## 2022-07-27 NOTE — LETTER
[address]  Phone: [place] , [place]    August 3, 2022     [address] [place] [address] [place] [address] [address]    Patient: Barbara Rico   MR Number: [id number]   YOB: 1970   Date of Visit: 7/27/2022       Dear Jules Penny: Thank you for referring Naellucinda Ken to me for evaluation/treatment. Below are the relevant portions of my assessment and plan of care. If you have questions, please do not hesitate to call me. I look forward to following Roshan Bui along with you.     Sincerely,      Britta Alvarenga DPM

## 2022-10-31 RX ORDER — M-VIT,TX,IRON,MINS/CALC/FOLIC 27MG-0.4MG
1 TABLET ORAL DAILY
COMMUNITY

## 2022-10-31 RX ORDER — ROSUVASTATIN CALCIUM 10 MG/1
10 TABLET, COATED ORAL DAILY
COMMUNITY

## 2022-10-31 NOTE — PROGRESS NOTES
Preoperative Instructions:    Stop eating solid foods at midnight the night prior to your surgery. Stop drinking clear liquids at midnight the night prior to your surgery. NO CHEW AFTER MIDNIGHT the night before your surgery. Arrive at the surgery center (3rd entrance) on ___[phone]____________ by ___0800am____________. Please stop any blood thinning medications as directed by your surgeon or prescribing physician. Failure to stop certain medications may interfere with your scheduled surgery. These may include: Aspirin, Coumadin, Plavix, NSAIDS (Motrin, Aleve, Advil, Mobic, Celebrex), Eliquis, Pradaxa, Xarelto, Fish oil, and herbal supplements. Hold Multivitamin as directed by surgeon. You may continue the rest of your medications through the night before surgery unless instructed otherwise. Day of surgery please take only the following medication(s) with a small sip of water:Losartan, Levothyroxine, Rosuvastatin       Please  shower with antibacterial soap and water the night before and the morning of surgery. surgery. Reminders:  -If you are going home the day of your procedure, you will need a family member or friend to stay during the procedure and drive you home after your procedure. Your  must be 25years of age or older and able to sign off on your discharge instructions.    -If you are going home the same day of your surgery, someone must remain with you for the first 24 hours after your surgery if you receive sedation or anesthesia.      -Please do not wear any jewelery, lotions, contacts  or body piercing the day of surgery

## 2022-11-01 ENCOUNTER — ANESTHESIA EVENT (OUTPATIENT)
Dept: OPERATING ROOM | Age: 52
End: 2022-11-01

## 2022-11-11 ENCOUNTER — ANESTHESIA (OUTPATIENT)
Dept: OPERATING ROOM | Age: 52
End: 2022-11-11

## 2022-11-11 ENCOUNTER — HOSPITAL ENCOUNTER (OUTPATIENT)
Age: 52
Setting detail: OUTPATIENT SURGERY
Discharge: HOME | End: 2022-11-11
Attending: PODIATRIST | Admitting: PODIATRIST
Payer: COMMERCIAL

## 2022-11-11 ENCOUNTER — APPOINTMENT (OUTPATIENT)
Dept: GENERAL RADIOLOGY | Age: 52
End: 2022-11-11
Payer: COMMERCIAL

## 2022-11-11 VITALS
BODY MASS INDEX: 30.72 KG/M2 | TEMPERATURE: 97.3 F | SYSTOLIC BLOOD PRESSURE: 137 MMHG | HEART RATE: 54 BPM | WEIGHT: 207.4 LBS | OXYGEN SATURATION: 99 % | RESPIRATION RATE: 14 BRPM | HEIGHT: 69 IN | DIASTOLIC BLOOD PRESSURE: 85 MMHG

## 2022-11-11 DIAGNOSIS — Z98.890 POST-OPERATIVE STATE: Primary | ICD-10-CM

## 2022-11-11 DIAGNOSIS — G89.18 POST-OP PAIN: ICD-10-CM

## 2022-11-11 PROCEDURE — L8642 HALLUX IMPLANT: HCPCS | Performed by: PODIATRIST

## 2022-11-11 PROCEDURE — 3700000000 HC ANESTHESIA ATTENDED CARE: Performed by: PODIATRIST

## 2022-11-11 PROCEDURE — 6370000000 HC RX 637 (ALT 250 FOR IP)

## 2022-11-11 PROCEDURE — 3600000014 HC SURGERY LEVEL 4 ADDTL 15MIN: Performed by: PODIATRIST

## 2022-11-11 PROCEDURE — 3600000004 HC SURGERY LEVEL 4 BASE: Performed by: PODIATRIST

## 2022-11-11 PROCEDURE — 73630 X-RAY EXAM OF FOOT: CPT

## 2022-11-11 PROCEDURE — 28291 CORRJ HALUX RIGDUS W/IMPLT: CPT | Performed by: PODIATRIST

## 2022-11-11 PROCEDURE — 7100000010 HC PHASE II RECOVERY - FIRST 15 MIN: Performed by: PODIATRIST

## 2022-11-11 PROCEDURE — 2580000003 HC RX 258: Performed by: ANESTHESIOLOGY

## 2022-11-11 PROCEDURE — 2709999900 HC NON-CHARGEABLE SUPPLY: Performed by: PODIATRIST

## 2022-11-11 PROCEDURE — 7100000011 HC PHASE II RECOVERY - ADDTL 15 MIN: Performed by: PODIATRIST

## 2022-11-11 PROCEDURE — 2500000003 HC RX 250 WO HCPCS

## 2022-11-11 PROCEDURE — 3700000001 HC ADD 15 MINUTES (ANESTHESIA): Performed by: PODIATRIST

## 2022-11-11 PROCEDURE — 2500000003 HC RX 250 WO HCPCS: Performed by: PODIATRIST

## 2022-11-11 DEVICE — IMPLANTABLE DEVICE: Type: IMPLANTABLE DEVICE | Site: FOOT | Status: FUNCTIONAL

## 2022-11-11 RX ORDER — PROPOFOL 10 MG/ML
INJECTION, EMULSION INTRAVENOUS CONTINUOUS PRN
Status: DISCONTINUED | OUTPATIENT
Start: 2022-11-11 | End: 2022-11-11 | Stop reason: SDUPTHER

## 2022-11-11 RX ORDER — OXYCODONE HYDROCHLORIDE AND ACETAMINOPHEN 5; 325 MG/1; MG/1
1 TABLET ORAL EVERY 6 HOURS PRN
Qty: 28 TABLET | Refills: 0 | Status: SHIPPED | OUTPATIENT
Start: 2022-11-11 | End: 2022-11-11 | Stop reason: SDUPTHER

## 2022-11-11 RX ORDER — SODIUM CHLORIDE, SODIUM LACTATE, POTASSIUM CHLORIDE, CALCIUM CHLORIDE 600; 310; 30; 20 MG/100ML; MG/100ML; MG/100ML; MG/100ML
INJECTION, SOLUTION INTRAVENOUS CONTINUOUS
Status: DISCONTINUED | OUTPATIENT
Start: 2022-11-11 | End: 2022-11-11 | Stop reason: HOSPADM

## 2022-11-11 RX ORDER — GLYCOPYRROLATE 0.2 MG/ML
0.2 INJECTION INTRAMUSCULAR; INTRAVENOUS ONCE
Status: COMPLETED | OUTPATIENT
Start: 2022-11-11 | End: 2022-11-11

## 2022-11-11 RX ORDER — MORPHINE SULFATE 2 MG/ML
1 INJECTION, SOLUTION INTRAMUSCULAR; INTRAVENOUS EVERY 5 MIN PRN
Status: DISCONTINUED | OUTPATIENT
Start: 2022-11-11 | End: 2022-11-11 | Stop reason: HOSPADM

## 2022-11-11 RX ORDER — OXYCODONE HYDROCHLORIDE AND ACETAMINOPHEN 5; 325 MG/1; MG/1
1 TABLET ORAL EVERY 6 HOURS PRN
Qty: 28 TABLET | Refills: 0 | Status: SHIPPED | OUTPATIENT
Start: 2022-11-11 | End: 2022-11-18

## 2022-11-11 RX ORDER — SODIUM CHLORIDE 9 MG/ML
INJECTION, SOLUTION INTRAVENOUS CONTINUOUS
Status: DISCONTINUED | OUTPATIENT
Start: 2022-11-11 | End: 2022-11-11 | Stop reason: HOSPADM

## 2022-11-11 RX ORDER — SODIUM CHLORIDE 0.9 % (FLUSH) 0.9 %
5-40 SYRINGE (ML) INJECTION PRN
Status: DISCONTINUED | OUTPATIENT
Start: 2022-11-11 | End: 2022-11-11 | Stop reason: HOSPADM

## 2022-11-11 RX ORDER — OXYCODONE HYDROCHLORIDE AND ACETAMINOPHEN 5; 325 MG/1; MG/1
1 TABLET ORAL ONCE
Status: COMPLETED | OUTPATIENT
Start: 2022-11-11 | End: 2022-11-11

## 2022-11-11 RX ORDER — ONDANSETRON 2 MG/ML
4 INJECTION INTRAMUSCULAR; INTRAVENOUS
Status: DISCONTINUED | OUTPATIENT
Start: 2022-11-11 | End: 2022-11-11 | Stop reason: HOSPADM

## 2022-11-11 RX ORDER — BUPIVACAINE HYDROCHLORIDE 5 MG/ML
INJECTION, SOLUTION EPIDURAL; INTRACAUDAL PRN
Status: DISCONTINUED | OUTPATIENT
Start: 2022-11-11 | End: 2022-11-11 | Stop reason: ALTCHOICE

## 2022-11-11 RX ORDER — SODIUM CHLORIDE 0.9 % (FLUSH) 0.9 %
5-40 SYRINGE (ML) INJECTION EVERY 12 HOURS SCHEDULED
Status: DISCONTINUED | OUTPATIENT
Start: 2022-11-11 | End: 2022-11-11 | Stop reason: HOSPADM

## 2022-11-11 RX ORDER — MIDAZOLAM HYDROCHLORIDE 1 MG/ML
INJECTION INTRAMUSCULAR; INTRAVENOUS PRN
Status: DISCONTINUED | OUTPATIENT
Start: 2022-11-11 | End: 2022-11-11 | Stop reason: SDUPTHER

## 2022-11-11 RX ORDER — LIDOCAINE HYDROCHLORIDE 10 MG/ML
INJECTION, SOLUTION INFILTRATION; PERINEURAL PRN
Status: DISCONTINUED | OUTPATIENT
Start: 2022-11-11 | End: 2022-11-11 | Stop reason: SDUPTHER

## 2022-11-11 RX ORDER — MEPERIDINE HYDROCHLORIDE 50 MG/ML
12.5 INJECTION INTRAMUSCULAR; INTRAVENOUS; SUBCUTANEOUS ONCE
Status: DISCONTINUED | OUTPATIENT
Start: 2022-11-11 | End: 2022-11-11 | Stop reason: HOSPADM

## 2022-11-11 RX ORDER — OXYCODONE HYDROCHLORIDE AND ACETAMINOPHEN 5; 325 MG/1; MG/1
TABLET ORAL
Status: COMPLETED
Start: 2022-11-11 | End: 2022-11-11

## 2022-11-11 RX ORDER — LIDOCAINE HYDROCHLORIDE 10 MG/ML
INJECTION, SOLUTION INFILTRATION; PERINEURAL PRN
Status: DISCONTINUED | OUTPATIENT
Start: 2022-11-11 | End: 2022-11-11 | Stop reason: ALTCHOICE

## 2022-11-11 RX ORDER — PROPOFOL 10 MG/ML
INJECTION, EMULSION INTRAVENOUS PRN
Status: DISCONTINUED | OUTPATIENT
Start: 2022-11-11 | End: 2022-11-11 | Stop reason: SDUPTHER

## 2022-11-11 RX ORDER — SODIUM CHLORIDE 9 MG/ML
25 INJECTION, SOLUTION INTRAVENOUS PRN
Status: DISCONTINUED | OUTPATIENT
Start: 2022-11-11 | End: 2022-11-11 | Stop reason: HOSPADM

## 2022-11-11 RX ORDER — CEFAZOLIN SODIUM 2 G/50ML
SOLUTION INTRAVENOUS PRN
Status: DISCONTINUED | OUTPATIENT
Start: 2022-11-11 | End: 2022-11-11 | Stop reason: SDUPTHER

## 2022-11-11 RX ORDER — SODIUM CHLORIDE 9 MG/ML
INJECTION, SOLUTION INTRAVENOUS PRN
Status: DISCONTINUED | OUTPATIENT
Start: 2022-11-11 | End: 2022-11-11 | Stop reason: HOSPADM

## 2022-11-11 RX ORDER — FENTANYL CITRATE 50 UG/ML
INJECTION, SOLUTION INTRAMUSCULAR; INTRAVENOUS PRN
Status: DISCONTINUED | OUTPATIENT
Start: 2022-11-11 | End: 2022-11-11 | Stop reason: SDUPTHER

## 2022-11-11 RX ORDER — DIPHENHYDRAMINE HYDROCHLORIDE 50 MG/ML
12.5 INJECTION INTRAMUSCULAR; INTRAVENOUS
Status: DISCONTINUED | OUTPATIENT
Start: 2022-11-11 | End: 2022-11-11 | Stop reason: HOSPADM

## 2022-11-11 RX ORDER — GLYCOPYRROLATE 0.2 MG/ML
INJECTION INTRAMUSCULAR; INTRAVENOUS
Status: COMPLETED
Start: 2022-11-11 | End: 2022-11-11

## 2022-11-11 RX ORDER — LIDOCAINE HYDROCHLORIDE 10 MG/ML
INJECTION, SOLUTION EPIDURAL; INFILTRATION; INTRACAUDAL; PERINEURAL
Status: DISCONTINUED
Start: 2022-11-11 | End: 2022-11-11 | Stop reason: HOSPADM

## 2022-11-11 RX ADMIN — GLYCOPYRROLATE 0.2 MG: 0.2 INJECTION INTRAMUSCULAR; INTRAVENOUS at 11:35

## 2022-11-11 RX ADMIN — MIDAZOLAM HYDROCHLORIDE 2 MG: 1 INJECTION INTRAMUSCULAR; INTRAVENOUS at 09:25

## 2022-11-11 RX ADMIN — PROPOFOL 20 MG: 10 INJECTION, EMULSION INTRAVENOUS at 09:29

## 2022-11-11 RX ADMIN — OXYCODONE HYDROCHLORIDE AND ACETAMINOPHEN 1 TABLET: 5; 325 TABLET ORAL at 11:11

## 2022-11-11 RX ADMIN — PROPOFOL 130 MCG/KG/MIN: 10 INJECTION, EMULSION INTRAVENOUS at 09:26

## 2022-11-11 RX ADMIN — PROPOFOL 20 MG: 10 INJECTION, EMULSION INTRAVENOUS at 09:35

## 2022-11-11 RX ADMIN — SODIUM CHLORIDE, POTASSIUM CHLORIDE, SODIUM LACTATE AND CALCIUM CHLORIDE: 600; 310; 30; 20 INJECTION, SOLUTION INTRAVENOUS at 08:21

## 2022-11-11 RX ADMIN — PROPOFOL 30 MG: 10 INJECTION, EMULSION INTRAVENOUS at 09:39

## 2022-11-11 RX ADMIN — FENTANYL CITRATE 50 MCG: 50 INJECTION, SOLUTION INTRAMUSCULAR; INTRAVENOUS at 09:26

## 2022-11-11 RX ADMIN — PROPOFOL 50 MG: 10 INJECTION, EMULSION INTRAVENOUS at 09:26

## 2022-11-11 RX ADMIN — CEFAZOLIN SODIUM 2000 MG: 2 SOLUTION INTRAVENOUS at 09:32

## 2022-11-11 RX ADMIN — SODIUM CHLORIDE, POTASSIUM CHLORIDE, SODIUM LACTATE AND CALCIUM CHLORIDE: 600; 310; 30; 20 INJECTION, SOLUTION INTRAVENOUS at 09:59

## 2022-11-11 RX ADMIN — LIDOCAINE HYDROCHLORIDE 20 MG: 10 INJECTION, SOLUTION INFILTRATION; PERINEURAL at 09:25

## 2022-11-11 RX ADMIN — PROPOFOL 20 MG: 10 INJECTION, EMULSION INTRAVENOUS at 09:32

## 2022-11-11 RX ADMIN — PROPOFOL 20 MG: 10 INJECTION, EMULSION INTRAVENOUS at 09:53

## 2022-11-11 ASSESSMENT — PAIN DESCRIPTION - PAIN TYPE
TYPE: SURGICAL PAIN

## 2022-11-11 ASSESSMENT — PAIN SCALES - GENERAL
PAINLEVEL_OUTOF10: 2
PAINLEVEL_OUTOF10: 5
PAINLEVEL_OUTOF10: 4
PAINLEVEL_OUTOF10: 6

## 2022-11-11 ASSESSMENT — PAIN DESCRIPTION - DESCRIPTORS
DESCRIPTORS: THROBBING
DESCRIPTORS: THROBBING

## 2022-11-11 ASSESSMENT — PAIN DESCRIPTION - LOCATION
LOCATION: FOOT

## 2022-11-11 ASSESSMENT — PAIN DESCRIPTION - ORIENTATION
ORIENTATION: RIGHT

## 2022-11-11 ASSESSMENT — PAIN - FUNCTIONAL ASSESSMENT: PAIN_FUNCTIONAL_ASSESSMENT: NONE - DENIES PAIN

## 2022-11-11 NOTE — OP NOTE
Operative Note      Patient: Tim David  YOB: 1970  MRN: [id number]    Date of Procedure: 11/11/2022    Pre-Op Diagnosis:   Hallux limitus of right foot [M21.611]  Right foot pain    Post-Op Diagnosis: Same       Procedure:  1st MTPJ implant, right foot    Surgeon(s):  Bolivar Patel DPM    Assistant:  Resident: Carli Fitzgerald DPM; Karen Hopkins DPM    Anesthesia: Monitor Anesthesia Care with 10 ml of a 1:1 racemic mixture of 0.5% marcaine plain and 1% lidocaine plain    Hemostasis: Ankle tourniquet at 250 mmHg for 58 minutes    Estimated Blood Loss (mL): Minimal    Materials: None    Injectables: 10 ml of a 1:1 racemic mixture of 0.5% marcaine plain and 1% lidocaine plain pre-operatively, 10 mL of 1% Lidocaine plain intra-operatively    Complications: None    Specimens:   * No specimens in log *    Implants:  Implant Name Type Inv. Item Serial No.  Lot No. LRB No. Used Action   JOINT TOE CLASSIC GREAT TOE 50 RAVI RED - [id number]  JOINT TOE CLASSIC GREAT TOE 50 RAVI RED  [employer][id number] Right 1 Implanted         Drains: * No LDAs found *    Detailed Description of Procedure: Indications for procedure: Patient has had a painful right big toe joint for a long time and has developed severe arthritis with bone spurring. Patient failed conservative treatments and elected to undergo surgery. Risks and benefits were discussed. No guarantees were given or implied. PROCEDURE IN DETAIL: The patient was transported from pre-op to the operating room and placed on the operating table in the supine position with a safety strap across the lap. A pneumatic ankle tourniquet was applied to the right ankle. After adequate sedation by the Anesthesia, a dave block of 10 cc of 1:1 mixture of 1% lidocaine plain and 0.5% Marcaine plain was injected. The foot was then prepped and draped in the usual aseptic fashion. The foot was then exsanguinated with an Esmarch bandage.  The pneumatic ankle tourniquet was inflated to 250 mmHg. Attention was directed to the right 1st MTPJ. A dorsal medial incision was created over the first metatarsophalangeal joint with a #15 blade. The incision was then deepened. The skin and subcutaneous tissue were then undermined off of the capsule medially. A dorsal linear capsular incision was then created over the first metatarsophalangeal joint. The periosteum and capsule were then reflected off of the first metatarsal head. There was noted to be large osteophytes and loose bone fragments dorsal to the joint and within the joint. The articular cartilage was mostly eroded. A sagital saw was used to resect the osteophytes and remodel the 1st metatarsal.  Care was taken to preserve the extensor halucis longus tendon. Attention was then directed to the head of the first metatarsal. The Integra implant guide wire was placed in the metatarsal head. The reamer was then used to cut the groove for the implant. The implant was then placed within the hole and remained firmly fixed. The base of the proximal phalanx was remodeled, and the implant guide wire was placed, reamer was used to cut the groove for the implant for the proximal phalanx. The hallux was placed through range of motion and it was noted that there was smooth and greatly improved range of motion. Copious amounts of sterile saline was then used to irrigate the surgical site. The capsule was closed with 2-0 Monocryl. Subcutaneous closure was performed with 4-0 Monocryl followed by 4-0 Prolene for skin closure. Dressings consisted of adaptic, 4 x 4s, Kerlix and an Ace bandage. The pneumatic ankle tourniquet was released and immediate hyperemic flush was noted to all five digits of the right foot. A surgical boot was then applied postoperatively. The patient tolerated the above procedure and anesthesia well without complications.  The patient was transported from the operating room to the PACU with vital signs stable and vascular status intact. The patient is to follow up with Dr. Deirdre Rock as directed.      Electronically signed by Rafat Walls DPM on 11/11/2022 at 10:51 AM

## 2022-11-11 NOTE — H&P
[facility]        PATIENT NAME: Pablo Zamudio OF BIRTH: 1970  GENDER: male     Procedure Date: 11/11/2022  7:36 AM     Attending Provider: Yvan Magana DPM        Chief Complaint: right foot hallux limitus and bunion     Procedure Scheduled: right foot mod byrne bunionectomy vs bunion with implant     History of present Illness: The patient is a 47 y.o. male  who presents to pre-op area prior to scheduled with pain to the right great toe with range of motino . Pt last seen in office on 7/27/2022 w/ c/o right great toe pain that the toe doesn't bend. We got x rays and showed djd and a dorsal bony osteophyte . Pt recommended to undergo right great toe bunion surgery  at earliest possible convenience. Pt denies changes to his medical history since he was last seen in office. Denies current nausea, vomiting, chest pain, SOB, fever, and chills. Consent form signed in office reviewed w/ pt. Any/all additional questions/concerns were addressed and consent form updated w/ current date. Pt elected to pursue the above procedure  as scheduled for today. Past Medical History:  has a past medical history of High cholesterol, Hypertension, Snores, and Subclinical hypothyroidism. Past Surgical History:   Past Surgical History:   Procedure Laterality Date    HERNIA REPAIR      TONSILLECTOMY         Social History:  reports that he has quit smoking. His smoking use included cigarettes. His smokeless tobacco use includes chew. He reports current alcohol use of about 7.0 standard drinks per week. He reports that he does not use drugs. Family History: family history includes High Blood Pressure in his father; Stroke in his father. Review of Systems:   Negative except as listed above    Allergies: Patient has no known allergies.     Current Meds:  Current Facility-Administered Medications:     0.9 % sodium chloride infusion, , IntraVENous, Continuous, Ambreen Ibrahim MD    lactated ringers infusion, , IntraVENous, Continuous, Ambreen Ibrahim MD, Last Rate: 125 mL/hr at 11/11/22 0821, New Bag at 11/11/22 0821    sodium chloride flush 0.9 % injection 5-40 mL, 5-40 mL, IntraVENous, 2 times per day, Ambreen Ibrahim MD    sodium chloride flush 0.9 % injection 5-40 mL, 5-40 mL, IntraVENous, PRN, Ambreen Ibrahim MD    0.9 % sodium chloride infusion, , IntraVENous, PRN, Ambreen Ibrahim MD    Vital Signs:  Vitals:    11/11/22 0804   BP: (!) 134/93   Pulse: 65   Resp: 15   Temp: 96.9 °F (36.1 °C)   SpO2: 95%       Physical Exam:  Vital signs and Nurse's note reviewed  Gen:  A&Ox3, NAD  HEENT: NCAT, PERRLA, EOMI, no scleral icterus, oral mucosa moist  Neck: Supple, trachea midline  Chest: Symmetric rise with inhalation, no evidence of trauma  CVS: Regular rate and rhythm, no murmurs, no rubs or gallops   Resp: Good bilateral air entry, clear to auscultation b/l, no wheeze or rhonchi   Abd: soft, non-tender, non-distended  Ext: No clubbing, cyanosis, edema, peripheral pulses 2+ Rad/Fem/DP/PT  CNS: Moves all extremities, no gross focal motor deficits  Skin: No erythema or ulcerations     Labs:   Lab Results   Component Value Date/Time    WBC 6.3 05/12/2018 08:27 AM    HGB 14.9 05/12/2018 08:27 AM    HCT 44.8 05/12/2018 08:27 AM    MCV 88.7 05/12/2018 08:27 AM     05/12/2018 08:27 AM     Lab Results   Component Value Date/Time     10/21/2018 08:48 AM    K 5.1 10/21/2018 08:48 AM     10/21/2018 08:48 AM    CO2 24 10/21/2018 08:48 AM    BUN 14 10/21/2018 08:48 AM    CREATININE 1.10 10/21/2018 08:48 AM    GLUCOSE 93 10/21/2018 08:48 AM    CALCIUM 9.5 10/21/2018 08:48 AM     Lab Results   Component Value Date/Time    ALKPHOS 55 10/21/2018 08:48 AM    ALT 29 10/21/2018 08:48 AM    AST 25 10/21/2018 08:48 AM    PROT 7.4 10/21/2018 08:48 AM    BILITOT 0.62 10/21/2018 08:48 AM    LABALBU 4.6 10/21/2018 08:48 AM     No results found for: LACTA  No results found for: AMYLASE  No results found for: LIPASE  No results found for: INR      Radiology:  1. CT Abd/pelvis:   2. CXR:  3. US Liver/biliary:      Assessment:  Active Problems:    * No active hospital problems. *  Resolved Problems:    * No resolved hospital problems.  *    Right modified byrne bunionectomy vs 1st MTP bunion with implant         Plan:  Proceed w/ the surgery  as scheduled for today  Pt to be [address] home post procedure    Electronically signed by Frankey Alf, DPM  on 11/11/2022 at 9:08 AM

## 2022-11-11 NOTE — ANESTHESIA POSTPROCEDURE EVALUATION
POST- ANESTHESIA EVALUATION       Pt Name: Percy Young  MRN: [id number]  [place]: 1970  Date of evaluation: 11/11/2022  Time:  12:00 PM      /85   Pulse 54   Temp 97.3 °F (36.3 °C) (Infrared)   Resp 14   Ht 5' 9\" (1.753 m)   Wt 207 lb 6.4 oz (94.1 kg)   SpO2 99%   BMI 30.63 kg/m²      Consciousness Level  Awake  Cardiopulmonary Status  Stable  Pain Adequately Treated YES  Nausea / Vomiting  NO  Adequate Hydration  YES  Anesthesia Related Complications NONE      Electronically signed by Wu Crow MD on 11/11/2022 at 12:00 PM       Department of Anesthesiology  Postprocedure Note    Patient: Percy Young  MRN: [id number]  [place]: 1970  Date of evaluation: 11/11/2022      Procedure Summary     Date: 11/11/22 Room / Location: [address]    Anesthesia Start: 1260 Anesthesia Stop: 4598    Procedure: RIGHT FOOT 1ST MTP BUNIONECTOMY WITH INTEGRA IMPLANT (Right) Diagnosis:       Bunion of right foot      (Bunion of right foot [M21.611])    Surgeons: Linda South DPM Responsible Provider: Wu Crow MD    Anesthesia Type: MAC, general ASA Status: 2          Anesthesia Type: No value filed.     Teresa Phase I: Teresa Score: 10    Teresa Phase II: Teresa Score: 10      Anesthesia Post Evaluation

## 2022-11-11 NOTE — ANESTHESIA PRE PROCEDURE
Department of Anesthesiology  Preprocedure Note       Name:  Rabia Poole   Age:  47 y.o.  :  1970                                          MRN:  [id number]         Date:  2022      Surgeon: Jamal Trinidad):  Tiffanie Huertas DPM    Procedure: Procedure(s):  RIGHT FOOT 1ST MTP INTEGRA IMPLANT VS MODIFIED DAVIS BUNIONECTOMY    Medications prior to admission:   Prior to Admission medications    Medication Sig Start Date End Date Taking? Authorizing Provider   LEVOTHYROXINE SODIUM PO Take 88 mcg by mouth daily   Yes Historical Provider, MD   rosuvastatin (CRESTOR) 10 MG tablet Take 10 mg by mouth daily   Yes Historical Provider, MD   Multiple Vitamins-Minerals (THERAPEUTIC MULTIVITAMIN-MINERALS) tablet Take 1 tablet by mouth daily   Yes Historical Provider, MD   clobetasol (TEMOVATE) 0.05 % external solution Apply to scalp 2 times daily 18   Karyle Roan, MD   losartan (COZAAR) 100 MG tablet TAKE 1 TABLET DAILY 18   Karyle Roan, MD   hydrocortisone (PROCTOSOL HC) 2.5 % rectal cream Place rectally 2 times daily.  18   Karyle Roan, MD   triamcinolone (KENALOG) 0.1 % cream Use as directed 18   Karyle Roan, MD       Current medications:    Current Facility-Administered Medications   Medication Dose Route Frequency Provider Last Rate Last Admin    0.9 % sodium chloride infusion   IntraVENous Continuous Uriah Rodriguez MD        lactated ringers infusion   IntraVENous Continuous Uriah Rodriguez MD        sodium chloride flush 0.9 % injection 5-40 mL  5-40 mL IntraVENous 2 times per day Uriah Rodriguez MD        sodium chloride flush 0.9 % injection 5-40 mL  5-40 mL IntraVENous PRN Uriah Rodriguez MD        0.9 % sodium chloride infusion   IntraVENous PRN Uriah Rodriguez MD           Allergies:  No Known Allergies    Problem List:    Patient Active Problem List   Diagnosis Code    Impaired fasting glucose R73.01    Abnormal transaminases R74.8    Essential hypertension I10    Smokeless tobacco use Z72.0    Family history of aortic aneurysm Z82.49    High cholesterol E78.00    Subclinical hypothyroidism E03.8       Past Medical History:        Diagnosis Date    High cholesterol 10/09/2017    Hypertension     Snores     never tested for LANRE    Subclinical hypothyroidism 05/16/2018       Past Surgical History:        Procedure Laterality Date    HERNIA REPAIR      TONSILLECTOMY         Social History:    Social History     Tobacco Use    Smoking status: Former     Types: Cigarettes    Smokeless tobacco: Current     Types: Chew   Substance Use Topics    Alcohol use: Yes     Comment: low alcohol beer daily                                Ready to quit: Not Answered  Counseling given: Not Answered      Vital Signs (Current):   Vitals:    10/31/22 1505   Weight: 200 lb (90.7 kg)                                              BP Readings from Last 3 Encounters:   10/28/19 116/74   10/26/18 122/80   05/16/18 118/70       NPO Status: Time of last liquid consumption: 0630                        Time of last solid consumption: 1900                        Date of last liquid consumption: 11/11/22                        Date of last solid food consumption: 11/10/22    BMI:   Wt Readings from Last 3 Encounters:   10/31/22 200 lb (90.7 kg)   07/27/22 200 lb (90.7 kg)   10/28/19 207 lb 3.7 oz (94 kg)     Body mass index is 29.53 kg/m².     CBC:   Lab Results   Component Value Date/Time    WBC 6.3 05/12/2018 08:27 AM    RBC 5.05 05/12/2018 08:27 AM    HGB 14.9 05/12/2018 08:27 AM    HCT 44.8 05/12/2018 08:27 AM    MCV 88.7 05/12/2018 08:27 AM    RDW 12.1 05/12/2018 08:27 AM     05/12/2018 08:27 AM       CMP:   Lab Results   Component Value Date/Time     10/21/2018 08:48 AM    K 5.1 10/21/2018 08:48 AM     10/21/2018 08:48 AM    CO2 24 10/21/2018 08:48 AM    BUN 14 10/21/2018 08:48 AM    CREATININE 1.10 10/21/2018 08:48 AM    GFRAA >60 10/21/2018 08:48 AM    LABGLOM >60 10/21/2018 08:48 AM GLUCOSE 93 10/21/2018 08:48 AM    PROT 7.4 10/21/2018 08:48 AM    CALCIUM 9.5 10/21/2018 08:48 AM    BILITOT 0.62 10/21/2018 08:48 AM    ALKPHOS 55 10/21/2018 08:48 AM    AST 25 10/21/2018 08:48 AM    ALT 29 10/21/2018 08:48 AM       POC Tests: No results for input(s): POCGLU, POCNA, POCK, POCCL, POCBUN, POCHEMO, POCHCT in the last 72 hours. Coags: No results found for: PROTIME, INR, APTT    HCG (If Applicable): No results found for: PREGTESTUR, PREGSERUM, HCG, HCGQUANT     ABGs: No results found for: PHART, PO2ART, WLG9CWD, IRQ1EAK, BEART, D2QBQSAY     Type & Screen (If Applicable):  No results found for: LABABO, LABRH    Drug/Infectious Status (If Applicable):  No results found for: HIV, HEPCAB    COVID-19 Screening (If Applicable): No results found for: COVID19        Anesthesia Evaluation  Patient summary reviewed and Nursing notes reviewed no history of anesthetic complications:   Airway: Mallampati: II  TM distance: >3 FB   Neck ROM: full  Mouth opening: > = 3 FB   Dental: normal exam         Pulmonary:Negative Pulmonary ROS and normal exam  breath sounds clear to auscultation                             Cardiovascular:    (+) hypertension: no interval change, hyperlipidemia        Rhythm: regular  Rate: normal                    Neuro/Psych:   Negative Neuro/Psych ROS              GI/Hepatic/Renal: Neg GI/Hepatic/Renal ROS            Endo/Other:    (+) hypothyroidism::., .                 Abdominal:       Abdomen: soft. Vascular: negative vascular ROS. Other Findings:           Anesthesia Plan      MAC and general     ASA 2             Anesthetic plan and risks discussed with patient. Plan discussed with CRNA.                     Lea Faith MD   11/11/2022

## 2022-11-11 NOTE — BRIEF OP NOTE
Brief Postoperative Note      Patient: Nery Owusu  YOB: 1970  MRN: [id number]    Date of Procedure: 11/11/2022    Pre-Op Diagnosis:   Hallux limitus of right foot [M21.611]  Right foot pain    Post-Op Diagnosis: Same       Procedure:  1st MTPJ implant, right foot    Surgeon(s):  Sujey Espinoza DPM    Assistant:  Resident: Faviola Thornton DPM; Eliana Santiago DPM    Anesthesia: Monitor Anesthesia Care with 10 ml of a 1:1 racemic mixture of 0.5% marcaine plain and 1% lidocaine plain    Hemostasis: Ankle tourniquet at 250 mmHg for 58 minutes    Estimated Blood Loss (mL): Minimal    Materials: None    Injectables: 10 ml of a 1:1 racemic mixture of 0.5% marcaine plain and 1% lidocaine plain pre-operatively, 10 mL of 1% Lidocaine plain intra-operatively    Complications: None    Specimens:   * No specimens in log *    Implants:  Implant Name Type Inv. Item Serial No.  Lot No. LRB No. Used Action   JOINT TOE CLASSIC GREAT TOE 50 RAVI RED - [id number]  JOINT TOE CLASSIC GREAT TOE 50 RAVI RED  [employer] [id number] Right 1 Implanted         Drains: * No LDAs found *    Findings: Osteophytes noted to right first metatarsophalangeal joint significant osteochondral defect to the metatarsal head. See full operative report for details.     Electronically signed by Faviola Thornton DPM on 11/11/2022 at 10:47 AM lactate = 2.5

## 2022-11-11 NOTE — DISCHARGE INSTRUCTIONS
Podiatric Post Operative Instructions: You have had a surgical procedure on your right foot. Fluids and Diet:  Begin with clear liquids, broth, dry toast, and crackers. If not nauseated then resume your regular pre-operative diet when you are ready    Medications: Take your prescriptions as directed  You are receiving new prescriptions for Percocet 5-325 mg. If your pain is not severe then you may take the non-prescription medication that you normally take for aches and pains  You may resume your regularly scheduled medications (unless otherwise directed)  If any side effects or adverse reactions occur, discontinue the medication and contact your doctor. Review the patient drug information that is provided before you take any medication    Ambulation and Activity:  You are advised to go directly home from the hospital  You may put weight on the operated foot. You should wear the surgical shoe at all times when awake. Avoid stairs if possible. Do not lift or move heavy objects  Do not drive until cleared by your physician    Bandage and Wound Care Instructions:  Keep bandage clean and dry  Do not shower or bathe the operative extremity  Do not remove the bandage (unless otherwise directed)   Do not attempt to put anything between the cast or dressing and your skin, some itching is normal.    Ice and Elevation:  Elevate operative extremity as much as possible to reduce swelling and discomfort. Elevate with 2 pillows at or above the level of the heart for the first 72 hours. Ice:  [facility] dispensed insulated ice bag over the bandage 20 minutes of every hour while awake for the first 72 hours. You may ice behind the knee as well. Special Instructions: Call your doctor immediately if you develop any of the following. Fever over 100.4 degrees Fahrenheit by mouth - take your temperature daily until your first follow up visit.   Pain not relieved by medication ordered  Swelling, increased redness, warmth, or hardness around operative area. Numb, tingling or cold toes. Toe(s) become white or bluish  Bandage becomes wet, soiled, or blood soaked (small amount of bleeding may be normal)  Increased or progressive drainage from surgical area. Follow up instructions: You will need to follow up with Dr. Yvan Magana DPM.  Call when you get home to schedule or confirm your appointment. Call your Podiatrist office if you have any questions or concerns.

## 2022-11-14 PROBLEM — Z98.890 POST-OPERATIVE STATE: Status: ACTIVE | Noted: 2022-11-14

## 2022-11-14 PROBLEM — M79.671 RIGHT FOOT PAIN: Status: ACTIVE | Noted: 2022-11-14

## 2022-11-14 PROBLEM — M20.21 HALLUX RIGIDUS OF RIGHT FOOT: Status: ACTIVE | Noted: 2022-11-14

## 2022-11-16 ENCOUNTER — OFFICE VISIT (OUTPATIENT)
Dept: PODIATRY | Age: 52
End: 2022-11-16

## 2022-11-16 VITALS — WEIGHT: 207 LBS | BODY MASS INDEX: 30.66 KG/M2 | HEIGHT: 69 IN

## 2022-11-16 DIAGNOSIS — Z98.890 POST-OPERATIVE STATE: Primary | ICD-10-CM

## 2022-11-16 PROCEDURE — 99024 POSTOP FOLLOW-UP VISIT: CPT | Performed by: PODIATRIST

## 2022-11-16 NOTE — PROGRESS NOTES
[address] [address]  Dept: [phone]    POST-OP PROGRESS NOTE  Date of patient's visit: 11/16/2022  Patient's Name:  Dustin Gross YOB: 1970            Patient Care Team:  Alfred Baker as PCP - General (Family Medicine)        Chief Complaint   Patient presents with    Post-Op Check     Post op x 5 days       Pt's primary care physician is Valdemra Rojas last seen June 30 2022     Subjective: Dustin Gross is a 47 y.o. male who presents to the office today 5 day(s)  S/P RIGHT FOOT 1ST MTP BUNIONECTOMY WITH INTEGRA IMPLANT for correction of   Problem List Items Addressed This Visit       Post-operative state - Primary   . Patient relates pain is Present and IMPROVED. Pain is rated 1 out of 10 and is described as intermittent. Currently denies F/C/N/V. Is patient taking pain medications as prescribed and is controlling pain yes    Physical Examination:  Incision is coapted, sutures/steri-strips are intact. Minimal bleeding post operatively. Edema present. No erythema. No Pus. Operative correction is satisfactory. Pain free ROM of the 1st MTP right     Radiographs: none      Assessment: Dustin Gross is status post RIGHT FOOT 1ST MTP BUNIONECTOMY WITH INTEGRA IMPLANT  Normal post operative course. Doing well          ICD-10-CM    1. Post-operative state  Z98.890             Plan:  Patient examined and evaluated. Current condition and treatment options discussed in detail. Advised pt to start working ROM  of the 1st MTPJ . Verbal and written instructions given to patient. Orders: No orders of the defined types were placed in this encounter. Contact office with any questions/problems/concerns.   RTC in 2week(s) for suture removal .     Electronically signed by Tanna Grove DPM on 11/16/2022 at 9:13 AM  11/16/2022

## 2022-11-28 ENCOUNTER — OFFICE VISIT (OUTPATIENT)
Dept: PODIATRY | Age: 52
End: 2022-11-28

## 2022-11-28 VITALS — BODY MASS INDEX: 30.66 KG/M2 | HEIGHT: 69 IN | WEIGHT: 207 LBS

## 2022-11-28 DIAGNOSIS — Z98.890 POST-OPERATIVE STATE: Primary | ICD-10-CM

## 2022-11-28 PROCEDURE — 99024 POSTOP FOLLOW-UP VISIT: CPT | Performed by: PODIATRIST

## 2022-11-28 NOTE — PROGRESS NOTES
[address] [place] [address]  Dept: [phone]    POST-OP PROGRESS NOTE  Date of patient's visit: 11/28/2022  Patient's Name:  Kenan Figueroa YOB: 1970            Patient Care Team:  Henry Polanco as PCP - General (Family Medicine)        Chief Complaint   Patient presents with    Post-Op Check       Pt's primary care physician is Rima Rojas last seen June 30 2022     Subjective: Kenan Figueroa is a 47 y.o. male who presents to the office today 2 weeks  S/P RIGHT FOOT 1ST MTP BUNIONECTOMY WITH INTEGRA IMPLANT for correction of   Problem List Items Addressed This Visit       Post-operative state - Primary   . Patient relates pain is Present and IMPROVED. Pain is rated 0 out of 10 and is described as intermittent. Currently denies F/C/N/V. Is patient taking pain medications as prescribed and is controlling pain yes    Physical Examination:  Incision is coapted, sutures/steri-strips are intact. Minimal bleeding post operatively. Edema present. No erythema. No Pus. Operative correction is satisfactory. Pain free ROM of the 1st MTP right     Radiographs: none      Assessment: Kenan Figueroa is status post RIGHT FOOT 1ST MTP BUNIONECTOMY WITH INTEGRA IMPLANT  Normal post operative course. Doing well          ICD-10-CM    1. Post-operative state  Z98.890             Plan:  Patient examined and evaluated. Current condition and treatment options discussed in detail. Verbal and written instructions given to patient. Orders: No orders of the defined types were placed in this encounter. Patient presents for suture removal. The wound is well healed without signs of infection. The sutures are removed and the steri strips applied followed by DSD consisting of 4x4, Kerlix and Ace Wrap. . Wound care and activity instructions given.  Gerhardt Pu office with any questions/problems/concerns.   RTC in 2 weeks     Electronically signed by Bolivar Patel DPM on 11/28/2022 at 9:27 AM  11/28/2022

## 2022-12-12 ENCOUNTER — OFFICE VISIT (OUTPATIENT)
Dept: PODIATRY | Age: 52
End: 2022-12-12

## 2022-12-12 VITALS — BODY MASS INDEX: 30.66 KG/M2 | WEIGHT: 207 LBS | HEIGHT: 69 IN

## 2022-12-12 DIAGNOSIS — Z98.890 POST-OPERATIVE STATE: Primary | ICD-10-CM

## 2022-12-12 PROCEDURE — 99024 POSTOP FOLLOW-UP VISIT: CPT | Performed by: PODIATRIST

## 2022-12-12 NOTE — PROGRESS NOTES
[address] [address]  Dept: [phone]    POST-OP PROGRESS NOTE  Date of patient's visit: 12/12/2022  Patient's Name:  Litzy Lowe YOB: 1970            Patient Care Team:  Ana Oneal as PCP - General (Family Medicine)        Chief Complaint   Patient presents with    Post-Op Check     Post op x 4 weeks       Pt's primary care physician is Miladys Rojas last seen June 30 2022     Subjective: Litzy Lowe is a 47 y.o. male who presents to the office today 4 weeks  S/P RIGHT FOOT 1ST MTP BUNIONECTOMY WITH INTEGRA IMPLANT for correction of   Problem List Items Addressed This Visit       Post-operative state - Primary    Relevant Orders    XR FOOT RIGHT (MIN 3 VIEWS) (Completed)   . Patient relates pain is Present and IMPROVED. Pain is rated 2 out of 10 and is described as intermittent. Currently denies F/C/N/V. Is patient taking pain medications as prescribed and is controlling pain yes    Physical Examination:  Incision is coapted, sutures/steri-strips are intact. Minimal bleeding post operatively. Edema present. No erythema. No Pus. Operative correction is satisfactory. Pain free ROM of the 1st MTP right     Radiographs:right foot 3 views:  intact implant with edema seen to the 1st MTPJ. No displacement seen     Assessment: Litzy Lowe is status post RIGHT FOOT 1ST MTP BUNIONECTOMY WITH INTEGRA IMPLANT  Normal post operative course. Doing well          ICD-10-CM    1. Post-operative state  Z98.890 XR FOOT RIGHT (MIN 3 VIEWS)            Plan:  Patient examined and evaluated. Current condition and treatment options discussed in detail. Verbal and written instructions given to patient. Orders:   Orders Placed This Encounter   Procedures    XR FOOT RIGHT (MIN 3 VIEWS)      Pt to weightbear as tolerated and work on ROM>  pt to return to work in 2 weeks.   He is to use stable stiff soled shoes at all times     Contact office with any questions/problems/concerns.   RTC in 2 weeks for final x rays      Electronically signed by Graham Nuñez DPM on 12/12/2022 at 9:14 AM  12/12/2022

## 2022-12-14 PROBLEM — Z98.890 POST-OPERATIVE STATE: Status: RESOLVED | Noted: 2022-11-14 | Resolved: 2022-12-14

## 2023-01-04 ENCOUNTER — OFFICE VISIT (OUTPATIENT)
Dept: PODIATRY | Age: 53
End: 2023-01-04

## 2023-01-04 VITALS — WEIGHT: 207 LBS | BODY MASS INDEX: 30.66 KG/M2 | HEIGHT: 69 IN

## 2023-01-04 DIAGNOSIS — Z98.890 POST-OPERATIVE STATE: Primary | ICD-10-CM

## 2023-01-04 PROCEDURE — 99024 POSTOP FOLLOW-UP VISIT: CPT | Performed by: PODIATRIST

## 2023-01-04 NOTE — PROGRESS NOTES
[address] [address]  Dept: [phone]    POST-OP PROGRESS NOTE  Date of patient's visit: 1/4/2023  Patient's Name:  Rubén Bills YOB: 1970            Patient Care Team:  Matthew Thompson as PCP - General (Family Medicine)        Chief Complaint   Patient presents with    Post-Op Check     Post op x 8 weeks       Pt's primary care physician is Eugene Rojas last seen June 30 2022     Subjective: Rubén Bills is a 47 y.o. male who presents to the office today 8 weeks  S/P RIGHT FOOT 1ST MTP BUNIONECTOMY WITH INTEGRA IMPLANT for correction of bunion of right foot. Problem List Items Addressed This Visit    None  Visit Diagnoses       Post-operative state    -  Primary    Relevant Orders    XR FOOT RIGHT (MIN 3 VIEWS)        . Patient relates pain is Present and IMPROVED. Pain is rated 2 out of 10 and is described as intermittent. Currently denies F/C/N/V. Is patient taking pain medications as prescribed and is controlling pain yes    Physical Examination:  Incision is coapted, sutures/steri-strips are intact. Minimal bleeding post operatively. Edema present. No erythema. No Pus. Operative correction is satisfactory. Pain free ROM of the 1st MTP right     Radiographs:right foot 3 views:  intact implant with no signs of hardware complication     Assessment: Rubén Bills is status post RIGHT FOOT 1ST MTP BUNIONECTOMY WITH INTEGRA IMPLANT  Normal post operative course. Doing well          ICD-10-CM    1. Post-operative state  Z98.890 XR FOOT RIGHT (MIN 3 VIEWS)            Plan:  Patient examined and evaluated. Current condition and treatment options discussed in detail. Verbal and written instructions given to patient.   Orders:   Orders Placed This Encounter   Procedures    XR FOOT RIGHT (MIN 3 VIEWS)     Order Specific Question:   Reason for exam:     Answer:   post operative state        Pt to weightbear as tolerated. Contact office with any questions/problems/concerns.   RTC in PRN     Electronically signed by José Miguel Cox DPM on 1/4/2023 at 9:46 AM  1/4/2023

## 2023-02-27 ENCOUNTER — OFFICE VISIT (OUTPATIENT)
Dept: PODIATRY | Age: 53
End: 2023-02-27
Payer: COMMERCIAL

## 2023-02-27 VITALS — WEIGHT: 207 LBS | BODY MASS INDEX: 30.66 KG/M2 | HEIGHT: 69 IN

## 2023-02-27 DIAGNOSIS — L60.0 INGROWING NAIL: ICD-10-CM

## 2023-02-27 DIAGNOSIS — L03.031 CELLULITIS OF GREAT TOE OF RIGHT FOOT: ICD-10-CM

## 2023-02-27 DIAGNOSIS — M79.671 RIGHT FOOT PAIN: Primary | ICD-10-CM

## 2023-02-27 PROCEDURE — 3017F COLORECTAL CA SCREEN DOC REV: CPT | Performed by: PODIATRIST

## 2023-02-27 PROCEDURE — 99214 OFFICE O/P EST MOD 30 MIN: CPT | Performed by: PODIATRIST

## 2023-02-27 PROCEDURE — 11750 EXCISION NAIL&NAIL MATRIX: CPT | Performed by: PODIATRIST

## 2023-02-27 PROCEDURE — G8484 FLU IMMUNIZE NO ADMIN: HCPCS | Performed by: PODIATRIST

## 2023-02-27 PROCEDURE — G8427 DOCREV CUR MEDS BY ELIG CLIN: HCPCS | Performed by: PODIATRIST

## 2023-02-27 PROCEDURE — 4004F PT TOBACCO SCREEN RCVD TLK: CPT | Performed by: PODIATRIST

## 2023-02-27 PROCEDURE — G8417 CALC BMI ABV UP PARAM F/U: HCPCS | Performed by: PODIATRIST

## 2023-02-27 RX ORDER — BETAMETHASONE VALERATE 1.2 MG/G
AEROSOL, FOAM TOPICAL
COMMUNITY
Start: 2022-12-11

## 2023-02-27 NOTE — PROGRESS NOTES
[address] [address]  Dept: [phone]     INGROWN TOENAIL NOTE  Date of patient's visit: 2/27/2023  Patient's Name:  Rose Marie Hughes YOB: 1970            Patient Care Team:  Hola Magaña as PCP - General (Family Medicine)      Chief Complaint   Patient presents with    Ingrown Toenail     Right great toe x 1 week        Rose Marie Hughes 47 y.o. male that presents complaining of a painful infected ingrown toenail on the 1st Right toes. Conservative therapy has failed. Symptoms began 1 week(s) ago. Patient relates pain is Present. Pain is rated 3 out of 10 and is described as intermittent. Treatments prior to today's visit include: none. Currently denies F/C/N/V. No Known Allergies    Past Medical History:   Diagnosis Date    High cholesterol 10/09/2017    Hypertension     Snores     never tested for LANRE    Subclinical hypothyroidism 05/16/2018       Prior to Admission medications    Medication Sig Start Date End Date Taking? Authorizing Provider   betamethasone valerate (LUXIQ) 0.12 % FOAM foam  12/11/22  Yes Historical Provider, MD   LEVOTHYROXINE SODIUM PO Take 88 mcg by mouth daily   Yes Historical Provider, MD   rosuvastatin (CRESTOR) 10 MG tablet Take 10 mg by mouth daily   Yes Historical Provider, MD   Multiple Vitamins-Minerals (THERAPEUTIC MULTIVITAMIN-MINERALS) tablet Take 1 tablet by mouth daily   Yes Historical Provider, MD   clobetasol (TEMOVATE) 0.05 % external solution Apply to scalp 2 times daily 11/1/18  Yes Janiya Shown, MD   losartan (COZAAR) 100 MG tablet TAKE 1 TABLET DAILY 11/1/18  Yes Janiya Shown, MD   hydrocortisone (PROCTOSOL HC) 2.5 % rectal cream Place rectally 2 times daily.  4/23/18  Yes Janiya Jennings MD   triamcinolone (KENALOG) 0.1 % cream Use as directed 4/23/18  Yes Janiya Jennings MD       Past Surgical History: Procedure Laterality Date    BUNIONECTOMY Right 11/11/2022    RIGHT FOOT 1ST MTP BUNIONECTOMY WITH INTEGRA IMPLANT    BUNIONECTOMY Right 11/11/2022    RIGHT FOOT 1ST MTP BUNIONECTOMY WITH INTEGRA IMPLANT performed by José Miguel Cox DPM at [facility]         Family History   Problem Relation Age of Onset    High Blood Pressure Father     Stroke Father        Social History     Tobacco Use    Smoking status: Former     Types: Cigarettes    Smokeless tobacco: Current     Types: Chew   Substance Use Topics    Alcohol use: Yes     Alcohol/week: 7.0 standard drinks     Types: 7 Cans of beer per week     Comment: low alcohol beer daily       Lower Extremity Physical Examination:     Vitals: There were no vitals filed for this visit. General: AAO x 3 in NAD. Dermatologic Exam:  Skin lesion/ulceration Absent . Skin No rashes or nodules noted. .       Musculoskeletal:     1st MPJ ROM decreased, Bilateral.  Muscle strength 5/5, Bilateral.  Pain present upon palpation of right great toe medial border . There is erythema and edema with tenderness and purulence Medial longitudinal arch, Bilateral WNL.   Ankle ROM WNL,Bilateral.    Dorsally contracted digits absent digits 1-5 Bilateral.     Vascular: DP and PT pulses palpable 2/4, Bilateral.  CFT <3 seconds, Bilateral.  Hair growth present to the level of the digits, Bilateral.  Edema absent, Bilateral.  Varicosities absent, Bilateral. Erythema absent, Bilateral    Neurological: Sensation intact to light touch to level of digits, Bilateral.  Protective sensation intact 10/10 sites via 5.07/10g [place] Monofilament, Bilateral.  negative Tinel's, Bilateral.  negative Valleix sign, Bilateral.      Integument: Warm, dry, supple, Bilateral.  Open lesion absent, Bilateral.  Interdigital maceration absent to web spaces 1-4, Bilateral.  Nails are normal in length, thickness and color 1-5 bilateral.  Fissures absent, Bilateral.   Asessment: Patient is a 52 y.o. male with:    Diagnosis Orders   1. Right foot pain  34574 - [place] REMOVAL OF NAIL BED      2. Ingrowing nail  08616 - [place] REMOVAL OF NAIL BED      3. Cellulitis of great toe of right foot            Paronychia  Onychocryptosis    Plan: Patient examined and evaluated.  Current condition and treatment options discussed in detail.      Verbal consent obtained for chemical matrixectomy after all questions answered.  Local anesthesia obtained via Hallux block to the Right hallux utilizing 5 cc of 1% Lidocaine plain.  Next the Right hallux was prepped with Betadine.  A digital tourniquet was applied.  A freer elevator was used to free the medial nail border.  An english anvil was used to remove the offending border in total.  A curette was used to ensure the offending border was free of any remaining nail.  Next, three 30 second applications of 89% Phenol were applied to the offending nail border followed by an isopropyl alcohol flush.  Triple antibiotic ointment was applied to the nail border followed by a semi-compressive dressing.      The patient was instructed to leave this dressing clean/dry/intact until the following am at which point they will begin warm epsom salt soaks followed by application of antibiotic ointment and Band-Aid BID.  Patient instructed to take Tylenol PRN pain. Post-operative chemical matrixectomy instruction sheet dispensed to patient.          No orders of the defined types were placed in this encounter.     RTC in 2week(s)    All labs were reviewed and all imagining including the above findings were reviewed PRIOR to the patients arrival and with the patient today.    Previous patient encounter was reviewed.  Encounters from the patients other medical providers were reviewed and noted.   I personally interpreted the imaging and labs and discussed the results with the patient Time was spent educating the patient and their families/caregivers on proper care of the  feet and ankles. All the above diagnosis were addressed at todays visit and all questions were answered. A total of 30 minutes was spent with this patients encounter which included charting after the patients visit  .     2/27/2023

## 2023-03-13 ENCOUNTER — OFFICE VISIT (OUTPATIENT)
Dept: PODIATRY | Age: 53
End: 2023-03-13

## 2023-03-13 VITALS — BODY MASS INDEX: 30.66 KG/M2 | WEIGHT: 207 LBS | HEIGHT: 69 IN

## 2023-03-13 DIAGNOSIS — Z98.890 POST-OPERATIVE STATE: Primary | ICD-10-CM

## 2023-03-13 PROCEDURE — 99024 POSTOP FOLLOW-UP VISIT: CPT | Performed by: PODIATRIST

## 2023-03-13 NOTE — PROGRESS NOTES
[address] [address]  Dept: [phone]    POST-OP PROGRESS NOTE  Date of patient's visit: 3/13/2023  Patient's Name:  Mando Pruitt YOB: 1970            Patient Care Team:  Megan Granado as PCP - General (Family Medicine)        Chief Complaint   Patient presents with    Post-Op Check     Post op x 2 weeks igt recheck       Pt's primary care physician is Warren Rojas last seen June 30 2022     Subjective: Mando Pruitt is a 47 y.o. male who presents to the office today 2week(s)  S/P chemical matrixectomy right great toe for correction of ingrown toenail  Problem List Items Addressed This Visit    None  Visit Diagnoses       Post-operative state    -  Primary        . Patient relates pain is Absent  and IMPROVED. Pain is rated 0 out of 10 and is described as none. Currently denies F/C/N/V. Is patient taking pain medications as prescribed and is controlling pain none    Physical Examination:  Dried serous fluid to the affected nail border. .  No purulence. Nail is free from the skin edge. No signs of infection. Assessment: Mando Pruitt is status post chemical matrixectomy right great toe  Normal post operative course. Doing well          ICD-10-CM    1. Post-operative state  Z98.890             Plan:  Patient examined and evaluated. Current condition and treatment options discussed in detail. Advised pt to his conditon. Advised pt to soak for one more week. Keep bandaid on during the day and remove at night for one more week. .  Verbal and written instructions given to patient. Orders: No orders of the defined types were placed in this encounter. Contact office with any questions/problems/concerns. RTC in PRN.      Electronically signed by Vladimir Prajapati DPM on 3/13/2023 at 1:06 PM  3/13/2023

## 2023-08-30 ENCOUNTER — OFFICE VISIT (OUTPATIENT)
Dept: GASTROENTEROLOGY | Age: 53
End: 2023-08-30
Payer: COMMERCIAL

## 2023-08-30 VITALS
WEIGHT: 207.6 LBS | HEIGHT: 70 IN | TEMPERATURE: 97.9 F | BODY MASS INDEX: 29.72 KG/M2 | DIASTOLIC BLOOD PRESSURE: 100 MMHG | HEART RATE: 94 BPM | SYSTOLIC BLOOD PRESSURE: 154 MMHG

## 2023-08-30 DIAGNOSIS — Z12.11 ENCOUNTER FOR SCREENING COLONOSCOPY: ICD-10-CM

## 2023-08-30 DIAGNOSIS — K58.1 IRRITABLE BOWEL SYNDROME WITH CONSTIPATION: Primary | ICD-10-CM

## 2023-08-30 PROCEDURE — 99203 OFFICE O/P NEW LOW 30 MIN: CPT | Performed by: INTERNAL MEDICINE

## 2023-08-30 PROCEDURE — 3077F SYST BP >= 140 MM HG: CPT | Performed by: INTERNAL MEDICINE

## 2023-08-30 PROCEDURE — 3080F DIAST BP >= 90 MM HG: CPT | Performed by: INTERNAL MEDICINE

## 2023-08-30 RX ORDER — POLYETHYLENE GLYCOL 3350 17 G/17G
POWDER, FOR SOLUTION ORAL
Qty: 238 G | Refills: 0 | Status: SHIPPED | OUTPATIENT
Start: 2023-08-30

## 2023-08-30 RX ORDER — BISACODYL 5 MG/1
TABLET, DELAYED RELEASE ORAL
Qty: 4 TABLET | Refills: 0 | Status: SHIPPED | OUTPATIENT
Start: 2023-08-30

## 2023-08-30 RX ORDER — HYOSCYAMINE SULFATE 0.12 MG/1
1 TABLET SUBLINGUAL 3 TIMES DAILY PRN
Qty: 15 EACH | Refills: 3 | Status: SHIPPED | OUTPATIENT
Start: 2023-08-30

## 2023-08-30 NOTE — PROGRESS NOTES
Reason for Referral:   Huseyin Camp  [address] [facility] [address] [facility],  [address]    Chief Complaint   Patient presents with    New Patient     Patient is here today as a new patient for constipation. HISTORY OF PRESENT ILLNESS: Mr.Douglas Suly Hurtado is a 47 y.o. male with a pasthistory remarkable for sub-clinical hypothyroidism, referred for evaluation of constipation. He is c/o- constipation- having BM once every few days. It is associated with cramp-like lower abdominal pain which improves with BM. He started taking fiber supplements in diet which helped with constipation. No c/o- nausea, vomiting, fever, loss of appetite, weight loss, bloating, bleeding MO, diarrhea, nocturnal diarrhea, tenesmus      Past Medical,Family, and Social History reviewed and does not contribute to the patient presentingcondition. Ramses davila's PMH/PSH,SH,PSYCH Hx, MEDs, ALLERGIES, and ROS were all reviewed and updated in the appropriate sections.     PAST MEDICAL HISTORY:  Past Medical History:   Diagnosis Date    High cholesterol 10/09/2017    Hypertension     Snores     never tested for LANRE    Subclinical hypothyroidism 05/16/2018       Past Surgical History:   Procedure Laterality Date    BUNIONECTOMY Right 11/11/2022    RIGHT FOOT 1ST MTP BUNIONECTOMY WITH INTEGRA IMPLANT    BUNIONECTOMY Right 11/11/2022    RIGHT FOOT 1ST MTP BUNIONECTOMY WITH INTEGRA IMPLANT performed by Magi Dominguez DPM at [facility] [address] [facility] [address]:    Current Outpatient Medications:     betamethasone valerate (LUXIQ) 0.12 % FOAM foam, , Disp: , Rfl:     LEVOTHYROXINE SODIUM PO, Take 88 mcg by mouth daily, Disp: , Rfl:     rosuvastatin (CRESTOR) 10 MG tablet, Take 1 tablet by mouth daily, Disp: , Rfl:     Multiple Vitamins-Minerals (THERAPEUTIC MULTIVITAMIN-MINERALS) tablet, Take 1 tablet by mouth daily, Disp: , Rfl:     clobetasol (TEMOVATE) 0.05 % external

## 2023-09-06 ENCOUNTER — TELEPHONE (OUTPATIENT)
Dept: GASTROENTEROLOGY | Age: 53
End: 2023-09-06

## 2023-09-06 NOTE — TELEPHONE ENCOUNTER
Returned call and the patient stated that he received a message that his PAT was in person tomorrow at 10:15 a.m. Called [facility] and verified with Richie Sr and it is a call. The patient was informed. Writer thanked and call ended.

## 2023-09-06 NOTE — TELEPHONE ENCOUNTER
Pt John Harmon asking for a return call regarding upcoming procedure with Dr. Ang Headley, please return call, thanks.

## 2023-09-07 ENCOUNTER — HOSPITAL ENCOUNTER (OUTPATIENT)
Dept: PREADMISSION TESTING | Age: 53
Discharge: HOME | End: 2023-09-11

## 2023-09-07 VITALS — HEIGHT: 70 IN | WEIGHT: 205 LBS | BODY MASS INDEX: 29.35 KG/M2

## 2023-09-07 NOTE — TELEPHONE ENCOUNTER
Patient LVM stating that he reviewed prep with PAT nurse and wanted to clarify some things with the prep. Returned call and reviewed again with him. Writer thanked and call ended.

## 2023-09-07 NOTE — PROGRESS NOTES
Pre-op Instructions For Out-Patient Endoscopy Surgery    Medication Instructions:  Please stop herbs and any supplements now (includes vitamins and minerals). Please contact your surgeon and prescribing physician for pre-op instructions for any blood thinners. If you have inhalers/aerosol treatments at home, please use them the morning of your surgery and bring the inhalers with you to the hospital.    Please take the following medications the morning of your surgery with a sip of water:    Levothyroxine     Surgery Instructions:  After midnight before surgery:  Do not eat or drink anything, including water, mints, gum, and hard candy. You may brush your teeth without swallowing. No smoking, chewing tobacco, or street drugs. Please shower or bathe before surgery. Please do not wear any cologne, lotion, powder, jewelry, piercings, perfume, makeup, nail polish, hair accessories, or hair spray on the day of surgery. Wear loose comfortable clothing. Leave your valuables at home but bring a payment source for any after-surgery prescriptions you plan to fill at [facility]. Bring a storage case for any glasses/contacts. An adult who is responsible for you MUST drive you home and should be with you for the first 24 hours after surgery. The Day of Surgery:  Arrive at [facility] AT [facility] Surgery Entrance at the time directed by your surgeon and check in at the desk. If you have a living will or healthcare power of , please bring a copy. You will be taken to the pre-op holding area where you will be prepared for surgery. A physical assessment will be performed by a nurse practitioner or house officer. Your IV will be started and you will meet your anesthesiologist.    When you go to surgery, your family will be directed to the surgical waiting room, where the doctor should speak with them after your surgery.     After surgery, you will be taken to the recovery

## 2023-09-13 NOTE — PRE-PROCEDURE INSTRUCTIONS
Have you received your Prep? YES Any questions with prep instructions? NO  Only Clear Liquid Diet day before. PATIENT TO FOLLOW DR SHAY INSTRUCTIONS  Nothing to eat after midnight day before procedure. PATIENT TO FOLLOW DR SHAY INSTRUCTIONS  Are you taking any blood thinners? NO If so, you need to Stop. Remove any jewelry and body piercings. INSTRUCTED  Are you having any Covid symptoms? NO  Do you have any new rashes, infections, etc. that we should be aware of? NO  Do you have a ride home the day of surgery? YES It cannot be a cab or medical transportation.   Verify surgery time/date and what time to arrive at hospital. [id number]

## 2023-09-14 ENCOUNTER — ANESTHESIA EVENT (OUTPATIENT)
Dept: ENDOSCOPY | Age: 53
End: 2023-09-14
Payer: COMMERCIAL

## 2023-09-15 ENCOUNTER — HOSPITAL ENCOUNTER (OUTPATIENT)
Age: 53
Setting detail: OUTPATIENT SURGERY
Discharge: HOME | End: 2023-09-15
Attending: INTERNAL MEDICINE | Admitting: INTERNAL MEDICINE
Payer: COMMERCIAL

## 2023-09-15 ENCOUNTER — ANESTHESIA (OUTPATIENT)
Dept: ENDOSCOPY | Age: 53
End: 2023-09-15
Payer: COMMERCIAL

## 2023-09-15 VITALS
OXYGEN SATURATION: 97 % | TEMPERATURE: 97.5 F | HEIGHT: 70 IN | DIASTOLIC BLOOD PRESSURE: 79 MMHG | SYSTOLIC BLOOD PRESSURE: 115 MMHG | WEIGHT: 205 LBS | RESPIRATION RATE: 16 BRPM | BODY MASS INDEX: 29.35 KG/M2 | HEART RATE: 64 BPM

## 2023-09-15 PROCEDURE — 2709999900 HC NON-CHARGEABLE SUPPLY: Performed by: INTERNAL MEDICINE

## 2023-09-15 PROCEDURE — 2500000003 HC RX 250 WO HCPCS: Performed by: NURSE ANESTHETIST, CERTIFIED REGISTERED

## 2023-09-15 PROCEDURE — 2580000003 HC RX 258: Performed by: NURSE ANESTHETIST, CERTIFIED REGISTERED

## 2023-09-15 PROCEDURE — 3700000001 HC ADD 15 MINUTES (ANESTHESIA): Performed by: INTERNAL MEDICINE

## 2023-09-15 PROCEDURE — 7100000010 HC PHASE II RECOVERY - FIRST 15 MIN: Performed by: INTERNAL MEDICINE

## 2023-09-15 PROCEDURE — 3700000000 HC ANESTHESIA ATTENDED CARE: Performed by: INTERNAL MEDICINE

## 2023-09-15 PROCEDURE — 3609027000 HC COLONOSCOPY: Performed by: INTERNAL MEDICINE

## 2023-09-15 PROCEDURE — 7100000011 HC PHASE II RECOVERY - ADDTL 15 MIN: Performed by: INTERNAL MEDICINE

## 2023-09-15 PROCEDURE — 2500000003 HC RX 250 WO HCPCS: Performed by: ANESTHESIOLOGY

## 2023-09-15 PROCEDURE — 45378 DIAGNOSTIC COLONOSCOPY: CPT | Performed by: INTERNAL MEDICINE

## 2023-09-15 PROCEDURE — 2580000003 HC RX 258: Performed by: ANESTHESIOLOGY

## 2023-09-15 PROCEDURE — 6360000002 HC RX W HCPCS: Performed by: NURSE ANESTHETIST, CERTIFIED REGISTERED

## 2023-09-15 RX ORDER — LIDOCAINE HYDROCHLORIDE 10 MG/ML
1 INJECTION, SOLUTION EPIDURAL; INFILTRATION; INTRACAUDAL; PERINEURAL
Status: COMPLETED | OUTPATIENT
Start: 2023-09-15 | End: 2023-09-15

## 2023-09-15 RX ORDER — SIMETHICONE 20 MG/.3ML
EMULSION ORAL PRN
Status: DISCONTINUED | OUTPATIENT
Start: 2023-09-15 | End: 2023-09-15 | Stop reason: ALTCHOICE

## 2023-09-15 RX ORDER — SODIUM CHLORIDE, SODIUM LACTATE, POTASSIUM CHLORIDE, CALCIUM CHLORIDE 600; 310; 30; 20 MG/100ML; MG/100ML; MG/100ML; MG/100ML
INJECTION, SOLUTION INTRAVENOUS CONTINUOUS PRN
Status: DISCONTINUED | OUTPATIENT
Start: 2023-09-15 | End: 2023-09-15 | Stop reason: SDUPTHER

## 2023-09-15 RX ORDER — UBIDECARENONE/VIT E/VIT E MIX 100-20-15
CAPSULE ORAL DAILY
COMMUNITY

## 2023-09-15 RX ORDER — SODIUM CHLORIDE 0.9 % (FLUSH) 0.9 %
5-40 SYRINGE (ML) INJECTION PRN
Status: DISCONTINUED | OUTPATIENT
Start: 2023-09-15 | End: 2023-09-15 | Stop reason: HOSPADM

## 2023-09-15 RX ORDER — LIDOCAINE HYDROCHLORIDE 20 MG/ML
INJECTION, SOLUTION EPIDURAL; INFILTRATION; INTRACAUDAL; PERINEURAL PRN
Status: DISCONTINUED | OUTPATIENT
Start: 2023-09-15 | End: 2023-09-15 | Stop reason: SDUPTHER

## 2023-09-15 RX ORDER — SODIUM CHLORIDE, SODIUM LACTATE, POTASSIUM CHLORIDE, CALCIUM CHLORIDE 600; 310; 30; 20 MG/100ML; MG/100ML; MG/100ML; MG/100ML
INJECTION, SOLUTION INTRAVENOUS CONTINUOUS
Status: DISCONTINUED | OUTPATIENT
Start: 2023-09-15 | End: 2023-09-15 | Stop reason: HOSPADM

## 2023-09-15 RX ORDER — SODIUM CHLORIDE 9 MG/ML
INJECTION, SOLUTION INTRAVENOUS PRN
Status: DISCONTINUED | OUTPATIENT
Start: 2023-09-15 | End: 2023-09-15 | Stop reason: HOSPADM

## 2023-09-15 RX ORDER — SODIUM CHLORIDE 0.9 % (FLUSH) 0.9 %
5-40 SYRINGE (ML) INJECTION EVERY 12 HOURS SCHEDULED
Status: DISCONTINUED | OUTPATIENT
Start: 2023-09-15 | End: 2023-09-15 | Stop reason: HOSPADM

## 2023-09-15 RX ORDER — PROPOFOL 10 MG/ML
INJECTION, EMULSION INTRAVENOUS PRN
Status: DISCONTINUED | OUTPATIENT
Start: 2023-09-15 | End: 2023-09-15 | Stop reason: SDUPTHER

## 2023-09-15 RX ADMIN — LIDOCAINE HYDROCHLORIDE 1 ML: 10 INJECTION, SOLUTION EPIDURAL; INFILTRATION; INTRACAUDAL; PERINEURAL at 12:06

## 2023-09-15 RX ADMIN — SODIUM CHLORIDE, POTASSIUM CHLORIDE, SODIUM LACTATE AND CALCIUM CHLORIDE: 600; 310; 30; 20 INJECTION, SOLUTION INTRAVENOUS at 14:44

## 2023-09-15 RX ADMIN — PROPOFOL 280 MG: 10 INJECTION, EMULSION INTRAVENOUS at 14:48

## 2023-09-15 RX ADMIN — SODIUM CHLORIDE, POTASSIUM CHLORIDE, SODIUM LACTATE AND CALCIUM CHLORIDE: 600; 310; 30; 20 INJECTION, SOLUTION INTRAVENOUS at 12:06

## 2023-09-15 RX ADMIN — LIDOCAINE HYDROCHLORIDE 40 MG: 20 INJECTION, SOLUTION EPIDURAL; INFILTRATION; INTRACAUDAL; PERINEURAL at 14:48

## 2023-09-15 ASSESSMENT — PAIN - FUNCTIONAL ASSESSMENT: PAIN_FUNCTIONAL_ASSESSMENT: 0-10

## 2023-09-15 ASSESSMENT — ENCOUNTER SYMPTOMS
SHORTNESS OF BREATH: 0
STRIDOR: 0

## 2023-09-15 ASSESSMENT — LIFESTYLE VARIABLES: SMOKING_STATUS: 0

## 2023-09-15 NOTE — OP NOTE
PROCEDURE NOTE    DATE OF PROCEDURE: 9/15/2023    SURGEON: Craig Vuong MD  Facility : [facility]  ASSISTANT: None  Anesthesia: Monitored anesthesia care  PREOPERATIVE DIAGNOSIS: Screening colonoscopy    POSTOPERATIVE DIAGNOSIS: as described below    OPERATION: Total colonoscopy     ANESTHESIA: Moderate Sedation    ESTIMATED BLOOD LOSS: less than 50     COMPLICATIONS: None. SPECIMENS:  Was Not Obtained    HISTORY: The patient is a 47y.o. year old male with history of above preop diagnosis. I recommended colonoscopy with possible biopsy or polypectomy and I explained the risk, benefits, expected outcome, and alternatives to the procedure. Risks included but are not limited to bleeding, infection, respiratory distress, hypotension, and perforation of the colon and possibility of missing a lesion. The patient understands and is in agreement. PROCEDURE: The patient was given IV conscious sedation. The patient's SPO2 remained above 90% throughout the procedure. The colonoscope was inserted per rectum and advanced under direct vision to the cecum without difficulty. Post sedation note : The patient's SPO2 remained above 90% throughout the procedure. the vital signs remained stable , and no immediate complication form the procedure noted, patient will be ready for d/c when criteria is met . The prep was fair. Findings:    Cecum/Ascending colon: normal    Transverse colon: normal    Descending/Sigmoid colon: normal    Rectum/Anus: examined in normal and retroflexed positions and was normal    Withdrawal Time was (minutes): 9    Diverticulosis: none    The colon was decompressed and the scope was removed. The patient tolerated the procedure well.      Impression: Normal exam  Fair prep, presence of smaller polyps cannot be ruled out    Recommendations/Plan:   Lifestyle and dietary modifications as discussed  F/U In Office No  Discussed with the family  Repeat colonoscopy in 3-4 years    Electronically signed by Selin Wade MD  on 9/15/2023 at 2:59 PM

## 2023-09-15 NOTE — H&P
HISTORY and [id number]       NAME:  Alfredo eWbb  MRN: [id number]   YOB: 1970   Date: 9/15/2023   Age: 47 y.o. Gender: male       COMPLAINT AND PRESENT HISTORY:       Alfredo Webb is 47 y.o.,   male, having a Screening Colonoscopy. Patient is being seen for Pre-Op Diagnosis Codes:     * Screen for colon cancer     This is patient's first Colonoscopy. Denies abdominal pain including bloating/gas. Pt report  changes in bowel habits. Until last 6-8 mos. Pt has had cyclic   diarrhea and  constipation,   No blood in stools, black tarry stools. No changes in appetite and unintended weight loss. Denies any nausea or vomiting. No  heartburn, indigestion or acid reflux. Denies Family Hx of colon cancer. Medical history reviewed:  Patient voices feeling well today. Denies any recent fever or chills, chest pain/pressure. Pt reports no SOB. Patient has been NPO since midnight. No blood thinners in the past  5-7 days. Pt took Levothyroxine  this am    Patient states has taken all bowel prep with clear outcome.    Patient denies any personal or family problems with anesthesia       PAST MEDICAL HISTORY     Past Medical History:   Diagnosis Date    High cholesterol 10/09/2017    Hypertension     Snores     never tested for LANRE    Subclinical hypothyroidism 05/16/2018       SURGICAL HISTORY       Past Surgical History:   Procedure Laterality Date    BUNIONECTOMY Right 11/11/2022    RIGHT FOOT 1ST MTP BUNIONECTOMY WITH INTEGRA IMPLANT    BUNIONECTOMY Right 11/11/2022    RIGHT FOOT 1ST MTP BUNIONECTOMY WITH INTEGRA IMPLANT performed by Shira Rubi DPM at [facility] [address] [place] [address] HISTORY       Family History   Problem Relation Age of Onset    High Blood Pressure Father     Stroke Father        SOCIAL HISTORY       Social History     Socioeconomic History    Marital status:    Tobacco

## 2023-09-15 NOTE — DISCHARGE INSTRUCTIONS
Sedation or General Anesthesia, Adult  Care After  Refer to this sheet in the next 24 hours. These instructions provide you with information on caring for yourself after your procedure. Your caregiver may also give you more specific instructions. Your treatment has been planned according to current medical practices, but problems sometimes occur. Call your caregiver if you have any problems or questions after your procedure. HOME CARE INSTRUCTIONS   Do not participate in any activities that require you to be alert or coordinated. Do not:  Drive. Swim. Ride a bicycle. Operate heavy machinery. [place]. Use power tools. Climb ladders. Work at [employer]. Take a bath. Do not drink alcohol. Do not make any important decisions or sign legal documents. Stay with an adult. The first meal following your procedure should be light and small. Avoid solid foods if you feel sick to your stomach (nauseous) or if you throw up (vomit). Drink enough fluids to keep your urine clear or pale yellow. Only take your usual medicines or new medicines if your caregiver approves them. Only take over-the-counter or prescription medicines for pain, discomfort, or fever as directed by your caregiver. Keep all follow-up appointments as directed by your caregiver. SEEK IMMEDIATE MEDICAL CARE IF:   You are not feeling normal or behaving normally after 24 hours. You have persistent nausea and vomiting. You are unable to drink fluids or eat food. You have difficulty urinating. You have difficulty breathing or speaking. You have blue or gray skin. There is difficulty waking or you cannot be woken up. You have heavy bleeding, redness, or a lot of swelling where the sedative or anesthesia entered your skin (intravenous site). You have a rash. MAKE SURE YOU:  Understand these instructions. Will watch your condition. Will get help right away if you are not doing well or get worse.   Document Released: 12/18/2006 Document Revised:

## 2024-09-04 NOTE — PROGRESS NOTES
Rx Refill Note  Requested Prescriptions     Pending Prescriptions Disp Refills    metoprolol succinate XL (TOPROL-XL) 50 MG 24 hr tablet [Pharmacy Med Name: METOPROLOL SUCC ER 50 MG TAB] 90 tablet 0     Sig: TAKE 1 TABLET BY MOUTH DAILY      Last office visit with prescribing clinician: 6/13/2024     Next office visit with prescribing clinician: 10/3/2024       Josselyn Lemus  09/04/24, 12:27 EDT      Visit Information    Have you changed or started any medications since your last visit including any over-the-counter medicines, vitamins, or herbal medicines? no   Have you stopped taking any of your medications? Is so, why? -  no  Are you having any side effects from any of your medications? - no    Have you seen any other physician or provider since your last visit?  no   Have you had any other diagnostic tests since your last visit?  no   Have you been seen in the emergency room and/or had an admission in a hospital since we last saw you?  no   Have you had your routine dental cleaning in the past 6 months?  yes - 2017     Do you have an active [facility] account? If no, what is the barrier?   No: not sure    Patient Care Team:  Clement White MD as PCP - General (Family Medicine)    Medical History Review  Past Medical, Family, and Social History reviewed and does not contribute to the patient presenting condition    Health Maintenance   Topic Date Due    DTaP/Tdap/Td vaccine (1 - Tdap) 10/24/1988    Pneumococcal med risk (1 of 1 - PPSV23) 10/24/1988    Flu vaccine (1) 09/01/2017    Lipid screen  10/07/2022    HIV screen  Completed

## (undated) DEVICE — MHPB HAND AND FOOT PACK: Brand: MEDLINE INDUSTRIES, INC.

## (undated) DEVICE — DRESSING PETRO W3XL3IN OIL EMUL N ADH GZ KNIT IMPREG CELOS

## (undated) DEVICE — GLOVE ORANGE PI 7 1/2   MSG9075

## (undated) DEVICE — BLUNTFILL: Brand: MONOJECT

## (undated) DEVICE — ENDO KIT W/SYRINGE: Brand: MEDLINE INDUSTRIES, INC.

## (undated) DEVICE — INTENDED FOR TISSUE SEPARATION, AND OTHER PROCEDURES THAT REQUIRE A SHARP SURGICAL BLADE TO PUNCTURE OR CUT.: Brand: BARD-PARKER ® CARBON RIB-BACK BLADES

## (undated) DEVICE — PRECISION OFFSET (9.0 X 0.64 X 25.0MM)

## (undated) DEVICE — BANDAGE,GAUZE,BULKEE II,4.5"X4.1YD,STRL: Brand: MEDLINE

## (undated) DEVICE — 4.0MM OVAL SOLID CARBIDE BUR MEDIUM

## (undated) DEVICE — SUTURE NONABSORBABLE MONOFILAMENT 4-0 PS-2 18 IN BLU PROLENE 8682H

## (undated) DEVICE — SUTURE MCRYL + SZ 4-0 L27IN ABSRB UD L19MM PS-2 3/8 CIR MCP426H

## (undated) DEVICE — BNDG,ELSTC,MATRIX,STRL,4"X5YD,LF,HOOK&LP: Brand: MEDLINE

## (undated) DEVICE — SOLUTION IRRIG 1000ML 0.9% SOD CHL USP POUR PLAS BTL

## (undated) DEVICE — GLOVE ORANGE PI 8   MSG9080

## (undated) DEVICE — DRAPE,EXTREMITY,89X128,STERILE: Brand: MEDLINE

## (undated) DEVICE — DEFENDO AIR WATER SUCTION AND BIOPSY VALVE KIT FOR  OLYMPUS: Brand: DEFENDO AIR/WATER/SUCTION AND BIOPSY VALVE

## (undated) DEVICE — STRAP,POSITIONING,KNEE/BODY,FOAM,4X60": Brand: MEDLINE

## (undated) DEVICE — APPLICATOR MEDICATED 26 CC SOLUTION HI LT ORNG CHLORAPREP

## (undated) DEVICE — SUTURE MCRYL SZ 2-0 L27IN ABSRB UD SH L26MM TAPERPOINT NDL Y417H

## (undated) DEVICE — BANDAGE GZ W2XL75IN ST RAYON POLY CNFRM STRTCH LTWT